# Patient Record
Sex: MALE | Race: WHITE | NOT HISPANIC OR LATINO | Employment: OTHER | URBAN - METROPOLITAN AREA
[De-identification: names, ages, dates, MRNs, and addresses within clinical notes are randomized per-mention and may not be internally consistent; named-entity substitution may affect disease eponyms.]

---

## 2018-08-26 ENCOUNTER — APPOINTMENT (OUTPATIENT)
Dept: CT IMAGING | Facility: HOSPITAL | Age: 78
End: 2018-08-26

## 2018-08-26 ENCOUNTER — APPOINTMENT (OUTPATIENT)
Dept: CARDIOLOGY | Facility: HOSPITAL | Age: 78
End: 2018-08-26
Attending: INTERNAL MEDICINE

## 2018-08-26 ENCOUNTER — HOSPITAL ENCOUNTER (INPATIENT)
Facility: HOSPITAL | Age: 78
LOS: 1 days | Discharge: ANOTHER HOSPITAL | End: 2018-08-28
Attending: EMERGENCY MEDICINE | Admitting: INTERNAL MEDICINE

## 2018-08-26 ENCOUNTER — APPOINTMENT (OUTPATIENT)
Dept: GENERAL RADIOLOGY | Facility: HOSPITAL | Age: 78
End: 2018-08-26

## 2018-08-26 DIAGNOSIS — H33.22 LEFT RETINAL DETACHMENT: ICD-10-CM

## 2018-08-26 DIAGNOSIS — R03.0 ELEVATED BLOOD PRESSURE READING: ICD-10-CM

## 2018-08-26 DIAGNOSIS — Z82.49 FAMILY HISTORY OF CORONARY ARTERY DISEASE: ICD-10-CM

## 2018-08-26 DIAGNOSIS — I25.118 CORONARY ARTERY DISEASE INVOLVING NATIVE CORONARY ARTERY OF NATIVE HEART WITH OTHER FORM OF ANGINA PECTORIS (HCC): ICD-10-CM

## 2018-08-26 DIAGNOSIS — I20.0 UNSTABLE ANGINA (HCC): Primary | ICD-10-CM

## 2018-08-26 PROBLEM — R07.9 CHEST PAIN: Status: ACTIVE | Noted: 2018-08-26

## 2018-08-26 LAB
ALBUMIN SERPL-MCNC: 4.13 G/DL (ref 3.2–4.8)
ALBUMIN/GLOB SERPL: 1.4 G/DL (ref 1.5–2.5)
ALP SERPL-CCNC: 57 U/L (ref 25–100)
ALT SERPL W P-5'-P-CCNC: 24 U/L (ref 7–40)
ANION GAP SERPL CALCULATED.3IONS-SCNC: 6 MMOL/L (ref 3–11)
AST SERPL-CCNC: 21 U/L (ref 0–33)
BASOPHILS # BLD AUTO: 0.03 10*3/MM3 (ref 0–0.2)
BASOPHILS NFR BLD AUTO: 0.5 % (ref 0–1)
BILIRUB SERPL-MCNC: 0.6 MG/DL (ref 0.3–1.2)
BNP SERPL-MCNC: 295 PG/ML (ref 0–100)
BUN BLD-MCNC: 17 MG/DL (ref 9–23)
BUN/CREAT SERPL: 14.3 (ref 7–25)
CALCIUM SPEC-SCNC: 9.1 MG/DL (ref 8.7–10.4)
CHLORIDE SERPL-SCNC: 113 MMOL/L (ref 99–109)
CO2 SERPL-SCNC: 19 MMOL/L (ref 20–31)
CREAT BLD-MCNC: 1.19 MG/DL (ref 0.6–1.3)
DEPRECATED RDW RBC AUTO: 47.4 FL (ref 37–54)
EOSINOPHIL # BLD AUTO: 0.22 10*3/MM3 (ref 0–0.3)
EOSINOPHIL NFR BLD AUTO: 3.6 % (ref 0–3)
ERYTHROCYTE [DISTWIDTH] IN BLOOD BY AUTOMATED COUNT: 13.7 % (ref 11.3–14.5)
GFR SERPL CREATININE-BSD FRML MDRD: 59 ML/MIN/1.73
GLOBULIN UR ELPH-MCNC: 2.9 GM/DL
GLUCOSE BLD-MCNC: 105 MG/DL (ref 70–100)
HCT VFR BLD AUTO: 46.9 % (ref 38.9–50.9)
HGB BLD-MCNC: 15.8 G/DL (ref 13.1–17.5)
HOLD SPECIMEN: NORMAL
HOLD SPECIMEN: NORMAL
IMM GRANULOCYTES # BLD: 0.01 10*3/MM3 (ref 0–0.03)
IMM GRANULOCYTES NFR BLD: 0.2 % (ref 0–0.6)
LIPASE SERPL-CCNC: 35 U/L (ref 6–51)
LYMPHOCYTES # BLD AUTO: 1.91 10*3/MM3 (ref 0.6–4.8)
LYMPHOCYTES NFR BLD AUTO: 31 % (ref 24–44)
MCH RBC QN AUTO: 31.8 PG (ref 27–31)
MCHC RBC AUTO-ENTMCNC: 33.7 G/DL (ref 32–36)
MCV RBC AUTO: 94.4 FL (ref 80–99)
MONOCYTES # BLD AUTO: 0.61 10*3/MM3 (ref 0–1)
MONOCYTES NFR BLD AUTO: 9.9 % (ref 0–12)
NEUTROPHILS # BLD AUTO: 3.39 10*3/MM3 (ref 1.5–8.3)
NEUTROPHILS NFR BLD AUTO: 55 % (ref 41–71)
PLATELET # BLD AUTO: 276 10*3/MM3 (ref 150–450)
PMV BLD AUTO: 10.6 FL (ref 6–12)
POTASSIUM BLD-SCNC: 4.3 MMOL/L (ref 3.5–5.5)
PROT SERPL-MCNC: 7 G/DL (ref 5.7–8.2)
RBC # BLD AUTO: 4.97 10*6/MM3 (ref 4.2–5.76)
SODIUM BLD-SCNC: 138 MMOL/L (ref 132–146)
TROPONIN I SERPL-MCNC: 0.04 NG/ML (ref 0–0.07)
TROPONIN I SERPL-MCNC: 0.05 NG/ML (ref 0–0.07)
WBC NRBC COR # BLD: 6.16 10*3/MM3 (ref 3.5–10.8)
WHOLE BLOOD HOLD SPECIMEN: NORMAL
WHOLE BLOOD HOLD SPECIMEN: NORMAL

## 2018-08-26 PROCEDURE — 93306 TTE W/DOPPLER COMPLETE: CPT

## 2018-08-26 PROCEDURE — 84484 ASSAY OF TROPONIN QUANT: CPT

## 2018-08-26 PROCEDURE — 71045 X-RAY EXAM CHEST 1 VIEW: CPT

## 2018-08-26 PROCEDURE — G0378 HOSPITAL OBSERVATION PER HR: HCPCS

## 2018-08-26 PROCEDURE — 80053 COMPREHEN METABOLIC PANEL: CPT | Performed by: EMERGENCY MEDICINE

## 2018-08-26 PROCEDURE — 99284 EMERGENCY DEPT VISIT MOD MDM: CPT

## 2018-08-26 PROCEDURE — 0 IOPAMIDOL PER 1 ML: Performed by: EMERGENCY MEDICINE

## 2018-08-26 PROCEDURE — 99223 1ST HOSP IP/OBS HIGH 75: CPT | Performed by: INTERNAL MEDICINE

## 2018-08-26 PROCEDURE — 85025 COMPLETE CBC W/AUTO DIFF WBC: CPT | Performed by: EMERGENCY MEDICINE

## 2018-08-26 PROCEDURE — 71275 CT ANGIOGRAPHY CHEST: CPT

## 2018-08-26 PROCEDURE — 25010000002 ENOXAPARIN PER 10 MG: Performed by: INTERNAL MEDICINE

## 2018-08-26 PROCEDURE — 83690 ASSAY OF LIPASE: CPT | Performed by: EMERGENCY MEDICINE

## 2018-08-26 PROCEDURE — 93005 ELECTROCARDIOGRAM TRACING: CPT | Performed by: EMERGENCY MEDICINE

## 2018-08-26 PROCEDURE — 93306 TTE W/DOPPLER COMPLETE: CPT | Performed by: INTERNAL MEDICINE

## 2018-08-26 PROCEDURE — 83880 ASSAY OF NATRIURETIC PEPTIDE: CPT | Performed by: EMERGENCY MEDICINE

## 2018-08-26 PROCEDURE — 70498 CT ANGIOGRAPHY NECK: CPT

## 2018-08-26 RX ORDER — ATORVASTATIN CALCIUM 40 MG/1
40 TABLET, FILM COATED ORAL NIGHTLY
Status: DISCONTINUED | OUTPATIENT
Start: 2018-08-26 | End: 2018-08-28

## 2018-08-26 RX ORDER — NAPROXEN SODIUM 220 MG
220 TABLET ORAL 2 TIMES DAILY PRN
COMMUNITY
End: 2018-08-26

## 2018-08-26 RX ORDER — ACETAMINOPHEN 325 MG/1
650 TABLET ORAL EVERY 4 HOURS PRN
Status: DISCONTINUED | OUTPATIENT
Start: 2018-08-26 | End: 2018-08-28 | Stop reason: HOSPADM

## 2018-08-26 RX ORDER — SODIUM CHLORIDE 0.9 % (FLUSH) 0.9 %
10 SYRINGE (ML) INJECTION AS NEEDED
Status: DISCONTINUED | OUTPATIENT
Start: 2018-08-26 | End: 2018-08-28 | Stop reason: HOSPADM

## 2018-08-26 RX ORDER — ASPIRIN 81 MG/1
324 TABLET, CHEWABLE ORAL ONCE
Status: COMPLETED | OUTPATIENT
Start: 2018-08-26 | End: 2018-08-26

## 2018-08-26 RX ORDER — ASPIRIN 325 MG
325 TABLET ORAL DAILY
COMMUNITY
End: 2018-08-26

## 2018-08-26 RX ORDER — SODIUM CHLORIDE 0.9 % (FLUSH) 0.9 %
1-10 SYRINGE (ML) INJECTION AS NEEDED
Status: DISCONTINUED | OUTPATIENT
Start: 2018-08-26 | End: 2018-08-28 | Stop reason: HOSPADM

## 2018-08-26 RX ADMIN — NITROGLYCERIN 1 INCH: 20 OINTMENT TOPICAL at 11:05

## 2018-08-26 RX ADMIN — ASPIRIN 81 MG CHEWABLE TABLET 324 MG: 81 TABLET CHEWABLE at 10:50

## 2018-08-26 RX ADMIN — ATORVASTATIN CALCIUM 40 MG: 40 TABLET, FILM COATED ORAL at 20:26

## 2018-08-26 RX ADMIN — IOPAMIDOL 120 ML: 755 INJECTION, SOLUTION INTRAVENOUS at 12:08

## 2018-08-26 RX ADMIN — SODIUM CHLORIDE 500 ML: 9 INJECTION, SOLUTION INTRAVENOUS at 12:53

## 2018-08-26 RX ADMIN — ENOXAPARIN SODIUM 40 MG: 40 INJECTION SUBCUTANEOUS at 16:25

## 2018-08-27 ENCOUNTER — APPOINTMENT (OUTPATIENT)
Dept: CARDIOLOGY | Facility: HOSPITAL | Age: 78
End: 2018-08-27
Attending: INTERNAL MEDICINE

## 2018-08-27 LAB
ANION GAP SERPL CALCULATED.3IONS-SCNC: 6 MMOL/L (ref 3–11)
ARTICHOKE IGE QN: 144 MG/DL (ref 0–130)
BH CV ECHO MEAS - AI DEC SLOPE: 138.6 CM/SEC^2
BH CV ECHO MEAS - AI MAX PG: 46.9 MMHG
BH CV ECHO MEAS - AI MAX VEL: 342.5 CM/SEC
BH CV ECHO MEAS - AI P1/2T: 723.7 MSEC
BH CV ECHO MEAS - AO ROOT AREA (BSA CORRECTED): 2.3
BH CV ECHO MEAS - AO ROOT AREA: 16.7 CM^2
BH CV ECHO MEAS - AO ROOT DIAM: 4.6 CM
BH CV ECHO MEAS - BSA(HAYCOCK): 2.1 M^2
BH CV ECHO MEAS - BSA: 2 M^2
BH CV ECHO MEAS - BZI_BMI: 32.3 KILOGRAMS/M^2
BH CV ECHO MEAS - BZI_METRIC_HEIGHT: 170.2 CM
BH CV ECHO MEAS - BZI_METRIC_WEIGHT: 93.4 KG
BH CV ECHO MEAS - EDV(CUBED): 149.7 ML
BH CV ECHO MEAS - EDV(MOD-SP2): 174 ML
BH CV ECHO MEAS - EDV(MOD-SP4): 166 ML
BH CV ECHO MEAS - EDV(TEICH): 135.9 ML
BH CV ECHO MEAS - EF(CUBED): 40.3 %
BH CV ECHO MEAS - EF(MOD-BP): 50 %
BH CV ECHO MEAS - EF(MOD-SP2): 50 %
BH CV ECHO MEAS - EF(MOD-SP4): 50.6 %
BH CV ECHO MEAS - EF(TEICH): 33 %
BH CV ECHO MEAS - ESV(CUBED): 89.4 ML
BH CV ECHO MEAS - ESV(MOD-SP2): 87 ML
BH CV ECHO MEAS - ESV(MOD-SP4): 82 ML
BH CV ECHO MEAS - ESV(TEICH): 91.1 ML
BH CV ECHO MEAS - FS: 15.8 %
BH CV ECHO MEAS - IVS/LVPW: 1
BH CV ECHO MEAS - IVSD: 1.2 CM
BH CV ECHO MEAS - LA DIMENSION: 3.9 CM
BH CV ECHO MEAS - LA/AO: 0.85
BH CV ECHO MEAS - LAT PEAK E' VEL: 4.5 CM/SEC
BH CV ECHO MEAS - LV DIASTOLIC VOL/BSA (35-75): 81.1 ML/M^2
BH CV ECHO MEAS - LV MASS(C)D: 249.2 GRAMS
BH CV ECHO MEAS - LV MASS(C)DI: 121.7 GRAMS/M^2
BH CV ECHO MEAS - LV SYSTOLIC VOL/BSA (12-30): 40 ML/M^2
BH CV ECHO MEAS - LVIDD: 5.3 CM
BH CV ECHO MEAS - LVIDS: 4.5 CM
BH CV ECHO MEAS - LVLD AP2: 9.3 CM
BH CV ECHO MEAS - LVLD AP4: 8.4 CM
BH CV ECHO MEAS - LVLS AP2: 8.3 CM
BH CV ECHO MEAS - LVLS AP4: 7.4 CM
BH CV ECHO MEAS - LVPWD: 1.2 CM
BH CV ECHO MEAS - MED PEAK E' VEL: 3.9 CM/SEC
BH CV ECHO MEAS - PA ACC SLOPE: 551.1 CM/SEC^2
BH CV ECHO MEAS - PA ACC TIME: 0.1 SEC
BH CV ECHO MEAS - PA PR(ACCEL): 33.1 MMHG
BH CV ECHO MEAS - SI(CUBED): 29.4 ML/M^2
BH CV ECHO MEAS - SI(MOD-SP2): 42.5 ML/M^2
BH CV ECHO MEAS - SI(MOD-SP4): 41 ML/M^2
BH CV ECHO MEAS - SI(TEICH): 21.9 ML/M^2
BH CV ECHO MEAS - SV(CUBED): 60.3 ML
BH CV ECHO MEAS - SV(MOD-SP2): 87 ML
BH CV ECHO MEAS - SV(MOD-SP4): 84 ML
BH CV ECHO MEAS - SV(TEICH): 44.8 ML
BH CV ECHO MEAS - TAPSE (>1.6): 2.2 CM2
BH CV STRESS BP STAGE 1: NORMAL
BH CV STRESS BP STAGE 3: NORMAL
BH CV STRESS COMMENTS STAGE 1: NORMAL
BH CV STRESS DOSE REGADENOSON STAGE 1: 0.4
BH CV STRESS DURATION MIN STAGE 1: 1
BH CV STRESS DURATION MIN STAGE 2: 1
BH CV STRESS DURATION MIN STAGE 3: 1
BH CV STRESS DURATION MIN STAGE 4: 1
BH CV STRESS DURATION SEC STAGE 1: 0
BH CV STRESS DURATION SEC STAGE 2: 0
BH CV STRESS DURATION SEC STAGE 3: 0
BH CV STRESS DURATION SEC STAGE 4: 0
BH CV STRESS HR STAGE 1: 83
BH CV STRESS HR STAGE 2: 96
BH CV STRESS HR STAGE 3: 95
BH CV STRESS HR STAGE 4: 92
BH CV STRESS PROTOCOL 1: NORMAL
BH CV STRESS RECOVERY BP: NORMAL MMHG
BH CV STRESS RECOVERY HR: 90 BPM
BH CV STRESS RECOVERY O2: 99 %
BH CV STRESS STAGE 1: 1
BH CV STRESS STAGE 2: 2
BH CV STRESS STAGE 3: 3
BH CV STRESS STAGE 4: 4
BH CV VAS BP RIGHT ARM: NORMAL MMHG
BH CV XLRA - RV BASE: 3.4 CM
BH CV XLRA - RV LENGTH: 7.7 CM
BH CV XLRA - RV MID: 2.8 CM
BH CV XLRA - TDI S': 13.7 CM/SEC
BUN BLD-MCNC: 19 MG/DL (ref 9–23)
BUN/CREAT SERPL: 18.1 (ref 7–25)
CALCIUM SPEC-SCNC: 8.8 MG/DL (ref 8.7–10.4)
CHLORIDE SERPL-SCNC: 113 MMOL/L (ref 99–109)
CHOLEST SERPL-MCNC: 196 MG/DL (ref 0–200)
CO2 SERPL-SCNC: 19 MMOL/L (ref 20–31)
CREAT BLD-MCNC: 1.05 MG/DL (ref 0.6–1.3)
GFR SERPL CREATININE-BSD FRML MDRD: 68 ML/MIN/1.73
GLUCOSE BLD-MCNC: 97 MG/DL (ref 70–100)
HDLC SERPL-MCNC: 39 MG/DL (ref 40–60)
LEFT ATRIUM VOLUME INDEX: 14.6 ML/M2
MAXIMAL PREDICTED HEART RATE: 143 BPM
MAXIMAL PREDICTED HEART RATE: 143 BPM
PERCENT MAX PREDICTED HR: 69.93 %
POTASSIUM BLD-SCNC: 4.4 MMOL/L (ref 3.5–5.5)
SODIUM BLD-SCNC: 138 MMOL/L (ref 132–146)
STRESS BASELINE BP: NORMAL MMHG
STRESS BASELINE HR: 75 BPM
STRESS O2 SAT REST: 94 %
STRESS PERCENT HR: 82 %
STRESS POST O2 SAT PEAK: 98 %
STRESS POST PEAK BP: NORMAL MMHG
STRESS POST PEAK HR: 100 BPM
STRESS TARGET HR: 122 BPM
STRESS TARGET HR: 122 BPM
TRIGL SERPL-MCNC: 182 MG/DL (ref 0–150)

## 2018-08-27 PROCEDURE — G0378 HOSPITAL OBSERVATION PER HR: HCPCS

## 2018-08-27 PROCEDURE — 93454 CORONARY ARTERY ANGIO S&I: CPT | Performed by: INTERNAL MEDICINE

## 2018-08-27 PROCEDURE — 0 RUBIDIUM CHLORIDE: Performed by: INTERNAL MEDICINE

## 2018-08-27 PROCEDURE — 4A023N7 MEASUREMENT OF CARDIAC SAMPLING AND PRESSURE, LEFT HEART, PERCUTANEOUS APPROACH: ICD-10-PCS | Performed by: INTERNAL MEDICINE

## 2018-08-27 PROCEDURE — C1894 INTRO/SHEATH, NON-LASER: HCPCS | Performed by: INTERNAL MEDICINE

## 2018-08-27 PROCEDURE — B3101ZZ FLUOROSCOPY OF THORACIC AORTA USING LOW OSMOLAR CONTRAST: ICD-10-PCS | Performed by: INTERNAL MEDICINE

## 2018-08-27 PROCEDURE — 93017 CV STRESS TEST TRACING ONLY: CPT

## 2018-08-27 PROCEDURE — 93018 CV STRESS TEST I&R ONLY: CPT | Performed by: INTERNAL MEDICINE

## 2018-08-27 PROCEDURE — 25010000002 REGADENOSON 0.4 MG/5ML SOLUTION: Performed by: INTERNAL MEDICINE

## 2018-08-27 PROCEDURE — 93567 NJX CAR CTH SPRVLV AORTGRPHY: CPT | Performed by: INTERNAL MEDICINE

## 2018-08-27 PROCEDURE — C1769 GUIDE WIRE: HCPCS | Performed by: INTERNAL MEDICINE

## 2018-08-27 PROCEDURE — 80061 LIPID PANEL: CPT | Performed by: INTERNAL MEDICINE

## 2018-08-27 PROCEDURE — B2111ZZ FLUOROSCOPY OF MULTIPLE CORONARY ARTERIES USING LOW OSMOLAR CONTRAST: ICD-10-PCS | Performed by: INTERNAL MEDICINE

## 2018-08-27 PROCEDURE — 78492 MYOCRD IMG PET MLT RST&STRS: CPT | Performed by: INTERNAL MEDICINE

## 2018-08-27 PROCEDURE — 78492 MYOCRD IMG PET MLT RST&STRS: CPT

## 2018-08-27 PROCEDURE — 25010000002 MIDAZOLAM PER 1 MG: Performed by: INTERNAL MEDICINE

## 2018-08-27 PROCEDURE — A9555 RB82 RUBIDIUM: HCPCS | Performed by: INTERNAL MEDICINE

## 2018-08-27 PROCEDURE — 80048 BASIC METABOLIC PNL TOTAL CA: CPT | Performed by: INTERNAL MEDICINE

## 2018-08-27 PROCEDURE — 0 IOPAMIDOL PER 1 ML: Performed by: INTERNAL MEDICINE

## 2018-08-27 PROCEDURE — 99024 POSTOP FOLLOW-UP VISIT: CPT | Performed by: INTERNAL MEDICINE

## 2018-08-27 RX ORDER — CHLORHEXIDINE GLUCONATE 500 MG/1
1 CLOTH TOPICAL EVERY 12 HOURS PRN
Status: DISCONTINUED | OUTPATIENT
Start: 2018-08-27 | End: 2018-08-27

## 2018-08-27 RX ORDER — IPRATROPIUM BROMIDE AND ALBUTEROL SULFATE 2.5; .5 MG/3ML; MG/3ML
3 SOLUTION RESPIRATORY (INHALATION) EVERY 4 HOURS PRN
Status: DISCONTINUED | OUTPATIENT
Start: 2018-08-27 | End: 2018-08-27

## 2018-08-27 RX ORDER — ASPIRIN 81 MG/1
81 TABLET ORAL ONCE
Status: DISCONTINUED | OUTPATIENT
Start: 2018-08-27 | End: 2018-08-28 | Stop reason: HOSPADM

## 2018-08-27 RX ORDER — CHLORHEXIDINE GLUCONATE 0.12 MG/ML
15 RINSE ORAL EVERY 12 HOURS
Status: DISCONTINUED | OUTPATIENT
Start: 2018-08-27 | End: 2018-08-27

## 2018-08-27 RX ORDER — SODIUM CHLORIDE 0.9 % (FLUSH) 0.9 %
1-10 SYRINGE (ML) INJECTION AS NEEDED
Status: DISCONTINUED | OUTPATIENT
Start: 2018-08-27 | End: 2018-08-28 | Stop reason: HOSPADM

## 2018-08-27 RX ORDER — ASPIRIN 81 MG/1
81 TABLET ORAL DAILY
Status: DISCONTINUED | OUTPATIENT
Start: 2018-08-27 | End: 2018-08-27

## 2018-08-27 RX ORDER — SODIUM CHLORIDE 9 MG/ML
100 INJECTION, SOLUTION INTRAVENOUS CONTINUOUS
Status: ACTIVE | OUTPATIENT
Start: 2018-08-27 | End: 2018-08-27

## 2018-08-27 RX ORDER — CARVEDILOL 3.12 MG/1
3.12 TABLET ORAL EVERY 12 HOURS SCHEDULED
Status: DISCONTINUED | OUTPATIENT
Start: 2018-08-27 | End: 2018-08-28 | Stop reason: HOSPADM

## 2018-08-27 RX ORDER — MIDAZOLAM HYDROCHLORIDE 1 MG/ML
INJECTION INTRAMUSCULAR; INTRAVENOUS AS NEEDED
Status: DISCONTINUED | OUTPATIENT
Start: 2018-08-27 | End: 2018-08-27 | Stop reason: HOSPADM

## 2018-08-27 RX ORDER — LIDOCAINE HYDROCHLORIDE 10 MG/ML
INJECTION, SOLUTION EPIDURAL; INFILTRATION; INTRACAUDAL; PERINEURAL AS NEEDED
Status: DISCONTINUED | OUTPATIENT
Start: 2018-08-27 | End: 2018-08-27 | Stop reason: HOSPADM

## 2018-08-27 RX ADMIN — ATORVASTATIN CALCIUM 40 MG: 40 TABLET, FILM COATED ORAL at 21:40

## 2018-08-27 RX ADMIN — CARVEDILOL 3.12 MG: 3.12 TABLET, FILM COATED ORAL at 21:40

## 2018-08-27 RX ADMIN — REGADENOSON 0.4 MG: 0.08 INJECTION, SOLUTION INTRAVENOUS at 09:20

## 2018-08-27 RX ADMIN — RUBIDIUM CHLORIDE RB-82 1 DOSE: 150 INJECTION, SOLUTION INTRAVENOUS at 09:20

## 2018-08-27 RX ADMIN — ASPIRIN 81 MG: 81 TABLET, COATED ORAL at 09:51

## 2018-08-27 RX ADMIN — RUBIDIUM CHLORIDE RB-82 1 DOSE: 150 INJECTION, SOLUTION INTRAVENOUS at 09:09

## 2018-08-28 VITALS
BODY MASS INDEX: 31.86 KG/M2 | SYSTOLIC BLOOD PRESSURE: 147 MMHG | OXYGEN SATURATION: 98 % | TEMPERATURE: 98.1 F | HEART RATE: 72 BPM | DIASTOLIC BLOOD PRESSURE: 92 MMHG | RESPIRATION RATE: 15 BRPM | HEIGHT: 67 IN | WEIGHT: 203 LBS

## 2018-08-28 PROBLEM — I20.0 UNSTABLE ANGINA (HCC): Status: ACTIVE | Noted: 2018-08-28

## 2018-08-28 PROCEDURE — 99238 HOSP IP/OBS DSCHRG MGMT 30/<: CPT | Performed by: INTERNAL MEDICINE

## 2018-08-28 RX ORDER — ISOSORBIDE DINITRATE 20 MG/1
20 TABLET ORAL
Qty: 90 TABLET | Refills: 1 | Status: SHIPPED | OUTPATIENT
Start: 2018-08-28 | End: 2018-10-29

## 2018-08-28 RX ORDER — ATORVASTATIN CALCIUM 40 MG/1
80 TABLET, FILM COATED ORAL NIGHTLY
Status: DISCONTINUED | OUTPATIENT
Start: 2018-08-28 | End: 2018-08-28 | Stop reason: HOSPADM

## 2018-08-28 RX ORDER — CARVEDILOL 3.12 MG/1
3.12 TABLET ORAL EVERY 12 HOURS SCHEDULED
Qty: 60 TABLET | Refills: 1 | Status: SHIPPED | OUTPATIENT
Start: 2018-08-28 | End: 2018-10-29

## 2018-08-28 RX ORDER — ISOSORBIDE DINITRATE 20 MG/1
20 TABLET ORAL
Status: DISCONTINUED | OUTPATIENT
Start: 2018-08-28 | End: 2018-08-28 | Stop reason: HOSPADM

## 2018-08-28 RX ORDER — ASPIRIN 81 MG/1
81 TABLET ORAL DAILY
Qty: 30 TABLET | Refills: 0 | Status: SHIPPED | OUTPATIENT
Start: 2018-08-28 | End: 2020-03-11

## 2018-08-28 RX ORDER — ATORVASTATIN CALCIUM 80 MG/1
80 TABLET, FILM COATED ORAL NIGHTLY
Qty: 30 TABLET | Refills: 1 | Status: SHIPPED | OUTPATIENT
Start: 2018-08-28 | End: 2018-10-29 | Stop reason: SDUPTHER

## 2018-08-28 RX ADMIN — CARVEDILOL 3.12 MG: 3.12 TABLET, FILM COATED ORAL at 09:40

## 2018-10-29 ENCOUNTER — OFFICE VISIT (OUTPATIENT)
Dept: CARDIOLOGY | Facility: CLINIC | Age: 78
End: 2018-10-29

## 2018-10-29 VITALS
HEART RATE: 78 BPM | HEIGHT: 67 IN | DIASTOLIC BLOOD PRESSURE: 64 MMHG | BODY MASS INDEX: 28.88 KG/M2 | OXYGEN SATURATION: 96 % | WEIGHT: 184 LBS | SYSTOLIC BLOOD PRESSURE: 118 MMHG

## 2018-10-29 DIAGNOSIS — I25.10 CORONARY ARTERY DISEASE INVOLVING NATIVE CORONARY ARTERY OF NATIVE HEART WITHOUT ANGINA PECTORIS: Primary | ICD-10-CM

## 2018-10-29 DIAGNOSIS — E78.2 MIXED HYPERLIPIDEMIA: ICD-10-CM

## 2018-10-29 DIAGNOSIS — I50.22 CHRONIC SYSTOLIC CONGESTIVE HEART FAILURE (HCC): ICD-10-CM

## 2018-10-29 DIAGNOSIS — I10 ESSENTIAL HYPERTENSION: ICD-10-CM

## 2018-10-29 PROBLEM — I20.0 UNSTABLE ANGINA (HCC): Status: RESOLVED | Noted: 2018-08-28 | Resolved: 2018-10-29

## 2018-10-29 PROBLEM — R07.9 CHEST PAIN: Status: RESOLVED | Noted: 2018-08-26 | Resolved: 2018-10-29

## 2018-10-29 PROCEDURE — 99213 OFFICE O/P EST LOW 20 MIN: CPT | Performed by: INTERNAL MEDICINE

## 2018-10-29 RX ORDER — TAMSULOSIN HYDROCHLORIDE 0.4 MG/1
2 CAPSULE ORAL NIGHTLY
COMMUNITY
End: 2020-09-14

## 2018-10-29 RX ORDER — ATORVASTATIN CALCIUM 80 MG/1
80 TABLET, FILM COATED ORAL NIGHTLY
Qty: 90 TABLET | Refills: 1 | Status: SHIPPED | OUTPATIENT
Start: 2018-10-29 | End: 2019-09-23 | Stop reason: SDUPTHER

## 2018-10-29 RX ORDER — SULFAMETHOXAZOLE AND TRIMETHOPRIM 400; 80 MG/1; MG/1
2 TABLET ORAL DAILY
COMMUNITY
End: 2019-09-09

## 2018-10-29 NOTE — PROGRESS NOTES
[facility] at [facility]  Office visit  Harpreet Ceballos  1940    There is no work phone number on file.    VISIT DATE:  10/29/2018    PCP: Provider, No Known  [place] [phone]    CC:  No chief complaint on file.      Previous cardiac studies and procedures:  August 2018  Echo  · Calculated EF = 50%. Estimated EF appears to be in the range of 51 - 55%.  · Left ventricular wall thickness is consistent with mild concentric hypertrophy.  · Mild to moderate aortic valve regurgitation is present.  · Mild dilation of the ascending aorta is present. 4 cm.  Myocardial perfusion imaging  · REST EF = 38% STRESS EF = 34%.  · Left ventricular ejection fraction is moderately reduced and severely dilated.  · Moderate size region of moderate ischemia involving the anterior wall and apex.  · Large region of ischemia involving the entire lateral wall, significantly decreased at rest well potentially consistent for underlying infarction versus hibernating myocardium  · Impressions are consistent with a high risk study. Concerning for multivessel disease.  Cardiac catheterization  · LM: Dilated as a continuation of dilated sinuses of Valsalva.    · LAD: 70% ostial stenosis, 60% proximal stenosis and a long 80% midsegment stenosis.  The distal vessel has diffuse plaque.  A medium size first diagonal has 90% stenosis.  · LCX: Nondominant vessel with 70% proximal stenosis and total mid segment occlusion.  A major obtuse marginal branch is also totally occluded in its midsegment.  The lateral filling of the obtuse marginal and the distal circumflex is seen via left-sided collaterals.  · RCA: Dominant vessel with a 90% distal stenosis.  · Three-vessel obstructive coronary disease.    September 2018   -coronary bypass grafting: LIMA to first diagonal and left anterior descending artery, vein graft to obtuse marginal one and obtuse marginal 2, vein graft to right posterior descending  artery.    ASSESSMENT:   Diagnosis Plan   1. Coronary artery disease involving native coronary artery of native heart without angina pectoris     2. Mixed hyperlipidemia     3. Essential hypertension         PLAN:  Echo in 4 months to assess postoperative EF  Fasting lipid panel and CMP in 4 months prior follow-up.  Goal LDL less than 70.  Continue high intensity statin therapy.  Continue low-dose beta-blockade.  Continue low-dose aspirin.    Subjective  Approximately 2 months status post surgical coronary revascularization.  Progressing very well.  Denies chest pain, dyspnea on exertion or palpitations.  Blood pressures running less than 130/80 mmHg.  He is compliant with statin.  Denies myalgias.  Has been gradually increasing his activity level without limitation.  Has brief episodes of dental SVT versus A. fib versus telemetry artifact postoperative period he trends his blood pressure closely at home and has not noticed any irregular heart rhythms at home.    PHYSICAL EXAMINATION:  There were no vitals filed for this visit.  General Appearance:    Alert, cooperative, no distress, appears stated age   Head:    Normocephalic, without obvious abnormality, atraumatic   Eyes:    conjunctiva/corneas clear   Nose:   Nares normal, septum midline, mucosa normal, no drainage   Throat:   Lips, teeth and gums normal   Neck:   Supple, symmetrical, trachea midline, no carotid    bruit or JVD   Lungs:     Clear to auscultation bilaterally, respirations unlabored   Chest Wall:    No tenderness or deformity    Heart:    Regular rate and rhythm, S1 and S2 normal, no murmur, rub   or gallop, normal carotid impulse bilaterally without bruit.   Abdomen:     Soft, non-tender   Extremities:   Extremities normal, atraumatic, no cyanosis or edema   Pulses:   2+ and symmetric all extremities   Skin:   Skin color, texture, turgor normal, no rashes or lesions       Diagnostic Data:  Procedures  Lab Results   Component Value Date    TRIG  182 (H) 08/27/2018    HDL 39 (L) 08/27/2018     Lab Results   Component Value Date    GLUCOSE 97 08/27/2018    BUN 19 08/27/2018    CREATININE 1.05 08/27/2018     08/27/2018    K 4.4 08/27/2018     (H) 08/27/2018    CO2 19.0 (L) 08/27/2018     No results found for: HGBA1C  Lab Results   Component Value Date    WBC 6.16 08/26/2018    HGB 15.8 08/26/2018    HCT 46.9 08/26/2018     08/26/2018       Allergies  No Known Allergies    Current Medications    Current Outpatient Prescriptions:   •  aspirin 81 MG EC tablet, Take 1 tablet by mouth Daily., Disp: 30 tablet, Rfl: 0  •  atorvastatin (LIPITOR) 80 MG tablet, Take 1 tablet by mouth Every Night., Disp: 30 tablet, Rfl: 1  •  carvedilol (COREG) 3.125 MG tablet, Take 1 tablet by mouth Every 12 (Twelve) Hours., Disp: 60 tablet, Rfl: 1  •  isosorbide dinitrate (ISORDIL) 20 MG tablet, Take 1 tablet by mouth 3 (Three) Times a Day., Disp: 90 tablet, Rfl: 1          ROS  Review of Systems   Cardiovascular: Negative for chest pain, dyspnea on exertion, leg swelling, near-syncope and palpitations.   Respiratory: Negative for cough, shortness of breath and sputum production.        SOCIAL HX  Social History     Social History   • Marital status:      Spouse name: N/A   • Number of children: N/A   • Years of education: N/A     Occupational History   • Not on file.     Social History Main Topics   • Smoking status: Never Smoker   • Smokeless tobacco: Never Used   • Alcohol use 0.6 oz/week     1 Cans of beer per week      Comment: A BEER A DAY   • Drug use: No   • Sexual activity: Defer     Other Topics Concern   • Not on file     Social History Narrative   • No narrative on file       FAMILY HX  No family history on file.          Oscar Anderson III, MD, [facility]

## 2019-02-26 ENCOUNTER — LAB (OUTPATIENT)
Dept: LAB | Facility: HOSPITAL | Age: 79
End: 2019-02-26

## 2019-02-26 DIAGNOSIS — E78.2 MIXED HYPERLIPIDEMIA: ICD-10-CM

## 2019-02-26 LAB
ALBUMIN SERPL-MCNC: 4.4 G/DL (ref 3.5–5)
ALBUMIN/GLOB SERPL: 1.4 G/DL (ref 1–2)
ALP SERPL-CCNC: 69 U/L (ref 38–126)
ALT SERPL W P-5'-P-CCNC: 19 U/L (ref 13–69)
ANION GAP SERPL CALCULATED.3IONS-SCNC: 15.5 MMOL/L (ref 10–20)
AST SERPL-CCNC: 21 U/L (ref 15–46)
BILIRUB SERPL-MCNC: 0.9 MG/DL (ref 0.2–1.3)
BUN BLD-MCNC: 18 MG/DL (ref 7–20)
BUN/CREAT SERPL: 16.4 (ref 6.3–21.9)
CALCIUM SPEC-SCNC: 9.6 MG/DL (ref 8.4–10.2)
CHLORIDE SERPL-SCNC: 108 MMOL/L (ref 98–107)
CHOLEST SERPL-MCNC: 116 MG/DL (ref 0–199)
CO2 SERPL-SCNC: 24 MMOL/L (ref 26–30)
CREAT BLD-MCNC: 1.1 MG/DL (ref 0.6–1.3)
GFR SERPL CREATININE-BSD FRML MDRD: 65 ML/MIN/1.73
GLOBULIN UR ELPH-MCNC: 3.1 GM/DL
GLUCOSE BLD-MCNC: 87 MG/DL (ref 74–98)
HDLC SERPL-MCNC: 45 MG/DL (ref 40–60)
LDLC SERPL CALC-MCNC: 52 MG/DL (ref 0–99)
LDLC/HDLC SERPL: 1.15 {RATIO}
POTASSIUM BLD-SCNC: 4.5 MMOL/L (ref 3.5–5.1)
PROT SERPL-MCNC: 7.5 G/DL (ref 6.3–8.2)
SODIUM BLD-SCNC: 143 MMOL/L (ref 137–145)
TRIGL SERPL-MCNC: 97 MG/DL
VLDLC SERPL-MCNC: 19.4 MG/DL

## 2019-02-26 PROCEDURE — 80053 COMPREHEN METABOLIC PANEL: CPT

## 2019-02-26 PROCEDURE — 80061 LIPID PANEL: CPT

## 2019-02-26 PROCEDURE — 36415 COLL VENOUS BLD VENIPUNCTURE: CPT

## 2019-03-01 ENCOUNTER — HOSPITAL ENCOUNTER (OUTPATIENT)
Dept: CARDIOLOGY | Facility: HOSPITAL | Age: 79
Discharge: HOME | End: 2019-03-01
Attending: INTERNAL MEDICINE | Admitting: INTERNAL MEDICINE

## 2019-03-01 DIAGNOSIS — I25.10 CORONARY ARTERY DISEASE INVOLVING NATIVE CORONARY ARTERY OF NATIVE HEART WITHOUT ANGINA PECTORIS: ICD-10-CM

## 2019-03-01 LAB
BH CV ECHO MEAS - AI DEC SLOPE: 136.7 CM/SEC^2
BH CV ECHO MEAS - AI MAX PG: 62.9 MMHG
BH CV ECHO MEAS - AI MAX VEL: 396.6 CM/SEC
BH CV ECHO MEAS - AI P1/2T: 849.7 MSEC
BH CV ECHO MEAS - AO ROOT AREA (BSA CORRECTED): 2.4
BH CV ECHO MEAS - AO ROOT AREA: 16.6 CM^2
BH CV ECHO MEAS - AO ROOT DIAM: 4.6 CM
BH CV ECHO MEAS - BSA(HAYCOCK): 2 M^2
BH CV ECHO MEAS - BSA: 2 M^2
BH CV ECHO MEAS - BZI_BMI: 28.8 KILOGRAMS/M^2
BH CV ECHO MEAS - BZI_METRIC_HEIGHT: 170.2 CM
BH CV ECHO MEAS - BZI_METRIC_WEIGHT: 83.5 KG
BH CV ECHO MEAS - EDV(CUBED): 123.4 ML
BH CV ECHO MEAS - EDV(TEICH): 117.1 ML
BH CV ECHO MEAS - EF(CUBED): 56 %
BH CV ECHO MEAS - EF(TEICH): 47.5 %
BH CV ECHO MEAS - ESV(CUBED): 54.3 ML
BH CV ECHO MEAS - ESV(TEICH): 61.4 ML
BH CV ECHO MEAS - FS: 24 %
BH CV ECHO MEAS - IVS/LVPW: 1
BH CV ECHO MEAS - IVSD: 1.3 CM
BH CV ECHO MEAS - LA DIMENSION: 3.5 CM
BH CV ECHO MEAS - LA/AO: 0.77
BH CV ECHO MEAS - LAD MAJOR: 5.2 CM
BH CV ECHO MEAS - LAT PEAK E' VEL: 8 CM/SEC
BH CV ECHO MEAS - LATERAL E/E' RATIO: 21.2
BH CV ECHO MEAS - LV MASS(C)D: 247.8 GRAMS
BH CV ECHO MEAS - LV MASS(C)DI: 127 GRAMS/M^2
BH CV ECHO MEAS - LVIDD: 5 CM
BH CV ECHO MEAS - LVIDS: 3.8 CM
BH CV ECHO MEAS - LVPWD: 1.2 CM
BH CV ECHO MEAS - MED PEAK E' VEL: 9.1 CM/SEC
BH CV ECHO MEAS - MEDIAL E/E' RATIO: 18.7
BH CV ECHO MEAS - MV A MAX VEL: 158.4 CM/SEC
BH CV ECHO MEAS - MV DEC SLOPE: 312.6 CM/SEC^2
BH CV ECHO MEAS - MV DEC TIME: 0.43 SEC
BH CV ECHO MEAS - MV E MAX VEL: 172.8 CM/SEC
BH CV ECHO MEAS - MV E/A: 1.1
BH CV ECHO MEAS - MV P1/2T MAX VEL: 133 CM/SEC
BH CV ECHO MEAS - MV P1/2T: 124.6 MSEC
BH CV ECHO MEAS - MVA P1/2T LCG: 1.7 CM^2
BH CV ECHO MEAS - MVA(P1/2T): 1.8 CM^2
BH CV ECHO MEAS - PA ACC SLOPE: 1008 CM/SEC^2
BH CV ECHO MEAS - PA ACC TIME: 0.07 SEC
BH CV ECHO MEAS - PA PR(ACCEL): 45.7 MMHG
BH CV ECHO MEAS - RAP SYSTOLE: 3 MMHG
BH CV ECHO MEAS - RV MAX PG: 1.1 MMHG
BH CV ECHO MEAS - RV V1 MAX: 51.3 CM/SEC
BH CV ECHO MEAS - RVSP: 18 MMHG
BH CV ECHO MEAS - SI(CUBED): 35.4 ML/M^2
BH CV ECHO MEAS - SI(TEICH): 28.5 ML/M^2
BH CV ECHO MEAS - SV(CUBED): 69.1 ML
BH CV ECHO MEAS - SV(TEICH): 55.7 ML
BH CV ECHO MEAS - TAPSE (>1.6): 1.8 CM2
BH CV ECHO MEAS - TR MAX PG: 15 MMHG
BH CV ECHO MEAS - TR MAX VEL: 190 CM/SEC
BH CV ECHO MEASUREMENTS AVERAGE E/E' RATIO: 20.21
BH CV XLRA - RV BASE: 3.9 CM
BH CV XLRA - RV LENGTH: 7 CM
BH CV XLRA - RV MID: 2.9 CM
BH CV XLRA - TDI S': 2.2 CM/SEC
LEFT ATRIUM VOLUME INDEX: 23.6 ML/M^2
LEFT ATRIUM VOLUME: 46 ML
MAXIMAL PREDICTED HEART RATE: 143 BPM
STRESS TARGET HR: 122 BPM

## 2019-03-01 PROCEDURE — 93306 TTE W/DOPPLER COMPLETE: CPT

## 2019-03-01 PROCEDURE — 93306 TTE W/DOPPLER COMPLETE: CPT | Performed by: INTERNAL MEDICINE

## 2019-03-04 ENCOUNTER — OFFICE VISIT (OUTPATIENT)
Dept: CARDIOLOGY | Facility: CLINIC | Age: 79
End: 2019-03-04

## 2019-03-04 VITALS
OXYGEN SATURATION: 95 % | DIASTOLIC BLOOD PRESSURE: 86 MMHG | WEIGHT: 191 LBS | HEART RATE: 74 BPM | HEIGHT: 68 IN | BODY MASS INDEX: 28.95 KG/M2 | SYSTOLIC BLOOD PRESSURE: 144 MMHG

## 2019-03-04 DIAGNOSIS — I65.23 CAROTID STENOSIS, ASYMPTOMATIC, BILATERAL: ICD-10-CM

## 2019-03-04 DIAGNOSIS — I25.10 CORONARY ARTERY DISEASE INVOLVING NATIVE CORONARY ARTERY OF NATIVE HEART WITHOUT ANGINA PECTORIS: Primary | ICD-10-CM

## 2019-03-04 DIAGNOSIS — I10 ESSENTIAL HYPERTENSION: ICD-10-CM

## 2019-03-04 DIAGNOSIS — E78.2 MIXED HYPERLIPIDEMIA: ICD-10-CM

## 2019-03-04 PROCEDURE — 99213 OFFICE O/P EST LOW 20 MIN: CPT | Performed by: INTERNAL MEDICINE

## 2019-03-04 RX ORDER — LOSARTAN POTASSIUM 50 MG/1
50 TABLET ORAL DAILY
Qty: 30 TABLET | Refills: 6 | Status: SHIPPED | OUTPATIENT
Start: 2019-03-04 | End: 2019-09-09 | Stop reason: SDUPTHER

## 2019-03-04 NOTE — PROGRESS NOTES
[facility] at [facility]  Office visit  Harpreet Ceballos  1940    There is no work phone number on file.    VISIT DATE:  10/29/2018    PCP: Provider, No Known  [place] [phone]    CC:  No chief complaint on file.      Previous cardiac studies and procedures:  August 2018  Echo  · Calculated EF = 50%. Estimated EF appears to be in the range of 51 - 55%.  · Left ventricular wall thickness is consistent with mild concentric hypertrophy.  · Mild to moderate aortic valve regurgitation is present.  · Mild dilation of the ascending aorta is present. 4 cm.  Myocardial perfusion imaging  · REST EF = 38% STRESS EF = 34%.  · Left ventricular ejection fraction is moderately reduced and severely dilated.  · Moderate size region of moderate ischemia involving the anterior wall and apex.  · Large region of ischemia involving the entire lateral wall, significantly decreased at rest well potentially consistent for underlying infarction versus hibernating myocardium  · Impressions are consistent with a high risk study. Concerning for multivessel disease.  Cardiac catheterization  · LM: Dilated as a continuation of dilated sinuses of Valsalva.    · LAD: 70% ostial stenosis, 60% proximal stenosis and a long 80% midsegment stenosis.  The distal vessel has diffuse plaque.  A medium size first diagonal has 90% stenosis.  · LCX: Nondominant vessel with 70% proximal stenosis and total mid segment occlusion.  A major obtuse marginal branch is also totally occluded in its midsegment.  The lateral filling of the obtuse marginal and the distal circumflex is seen via left-sided collaterals.  · RCA: Dominant vessel with a 90% distal stenosis.  · Three-vessel obstructive coronary disease.  CT angio chest  dilatation of the aortic root measuring up to 4.3 cm   CTA neck  Atherosclerotic involvement of the bilateral carotid bulbs far greater  on the right with approximately 80% luminal narrowing by  NASCET  Criteria.    September 2018   -coronary bypass grafting: LIMA to first diagonal and left anterior descending artery, vein graft to obtuse marginal one and obtuse marginal 2, vein graft to right posterior descending artery.    March 2019 echo  · Estimated EF appears to be in the range of 56 - 60%.  · Mild dilation of the sinuses of Valsalva, 4cm. Mild dilation of the ascending aorta, 4.3cm.  · Mild aortic valve regurgitation is present.  · Elevated left atrial pressure.    ASSESSMENT:   Diagnosis Plan   1. Coronary artery disease involving native coronary artery of native heart without angina pectoris     2. Essential hypertension     3. Mixed hyperlipidemia         PLAN:  Coronary artery disease: Stable and asymptomatic.  Continue aspirin, statin and afterload reduction  Hyperlipidemia: Currently with excellent control.  Goal LDL less than 70.  Continue high intensity statin therapy.  Carotid stenosis, bilateral: Continue aggressive risk factor modification.  Annual carotid ultrasound imaging.  Ascending aortic aneurysm: Goal blood pressure less than 130/80 mmHg.  Continue low-dose beta-blockade.  Adding losartan 25 mg p.o. daily.    Subjective  Mild shortness of breath and a class II pattern which is stable, experiences with such activities with walking up a steep incline.  Systolic blood pressures running in the 135-145 mmHg range.  He is compliant with medical therapy.  Denies easy bruising or bleeding complications on low-dose aspirin.  No myalgias on statin therapy.  Reviewed most recent transthoracic echo and fasting lipid panel.    PHYSICAL EXAMINATION:  There were no vitals filed for this visit.  General Appearance:    Alert, cooperative, no distress, appears stated age   Head:    Normocephalic, without obvious abnormality, atraumatic   Eyes:    conjunctiva/corneas clear   Nose:   Nares normal, septum midline, mucosa normal, no drainage   Throat:   Lips, teeth and gums normal   Neck:   Supple,  symmetrical, trachea midline, no carotid    bruit or JVD   Lungs:     Clear to auscultation bilaterally, respirations unlabored   Chest Wall:    No tenderness or deformity    Heart:    Regular rate and rhythm, S1 and S2 normal, no murmur, rub   or gallop, normal carotid impulse bilaterally without bruit.   Abdomen:     Soft, non-tender   Extremities:   Extremities normal, atraumatic, no cyanosis or edema   Pulses:   2+ and symmetric all extremities   Skin:   Skin color, texture, turgor normal, no rashes or lesions       Diagnostic Data:  Procedures  Lab Results   Component Value Date    TRIG 97 02/26/2019    HDL 45 02/26/2019     Lab Results   Component Value Date    GLUCOSE 87 02/26/2019    BUN 18 02/26/2019    CREATININE 1.10 02/26/2019     02/26/2019    K 4.5 02/26/2019     (H) 02/26/2019    CO2 24.0 (L) 02/26/2019     No results found for: HGBA1C  Lab Results   Component Value Date    WBC 6.16 08/26/2018    HGB 15.8 08/26/2018    HCT 46.9 08/26/2018     08/26/2018       Allergies  No Known Allergies    Current Medications    Current Outpatient Medications:   •  aspirin 81 MG EC tablet, Take 1 tablet by mouth Daily., Disp: 30 tablet, Rfl: 0  •  atorvastatin (LIPITOR) 80 MG tablet, Take 1 tablet by mouth Every Night., Disp: 90 tablet, Rfl: 1  •  metoprolol tartrate (LOPRESSOR) 25 MG tablet, Take 0.5 tablets by mouth Every 12 (Twelve) Hours., Disp: 90 tablet, Rfl: 1  •  sulfamethoxazole-trimethoprim (BACTRIM,SEPTRA) 400-80 MG tablet, Take 1 tablet by mouth 2 (Two) Times a Day., Disp: , Rfl:   •  tamsulosin (FLOMAX) 0.4 MG capsule 24 hr capsule, Take 1 capsule by mouth Every Night., Disp: , Rfl:           ROS  Review of Systems   Cardiovascular: Positive for irregular heartbeat. Negative for chest pain, dyspnea on exertion, leg swelling, near-syncope and palpitations.   Respiratory: Negative for cough, shortness of breath and sputum production.        SOCIAL HX  Social History     Socioeconomic  History   • Marital status:      Spouse name: Not on file   • Number of children: Not on file   • Years of education: Not on file   • Highest education level: Not on file   Social Needs   • Financial resource strain: Not on file   • Food insecurity - worry: Not on file   • Food insecurity - inability: Not on file   • Transportation needs - medical: Not on file   • Transportation needs - non-medical: Not on file   Occupational History   • Not on file   Tobacco Use   • Smoking status: Never Smoker   • Smokeless tobacco: Never Used   Substance and Sexual Activity   • Alcohol use: Yes     Alcohol/week: 0.6 oz     Types: 1 Cans of beer per week     Comment: A BEER A DAY   • Drug use: No   • Sexual activity: Defer   Other Topics Concern   • Not on file   Social History Narrative   • Not on file       FAMILY HX  Family History   Problem Relation Age of Onset   • No Known Problems Mother    • No Known Problems Father              Oscar Anderson III, MD, FACC

## 2019-04-02 ENCOUNTER — TELEPHONE (OUTPATIENT)
Dept: CARDIOLOGY | Facility: CLINIC | Age: 79
End: 2019-04-02

## 2019-04-02 NOTE — TELEPHONE ENCOUNTER
Salima at Dr.John Coley's office ([facility]) is requesting cardiac clearance for upcoming eye surgery. Please advise.

## 2019-09-09 ENCOUNTER — OFFICE VISIT (OUTPATIENT)
Dept: CARDIOLOGY | Facility: CLINIC | Age: 79
End: 2019-09-09

## 2019-09-09 VITALS
HEART RATE: 83 BPM | OXYGEN SATURATION: 96 % | SYSTOLIC BLOOD PRESSURE: 118 MMHG | HEIGHT: 65 IN | DIASTOLIC BLOOD PRESSURE: 82 MMHG | WEIGHT: 195 LBS | BODY MASS INDEX: 32.49 KG/M2

## 2019-09-09 DIAGNOSIS — I10 ESSENTIAL HYPERTENSION: ICD-10-CM

## 2019-09-09 DIAGNOSIS — I65.23 CAROTID STENOSIS, ASYMPTOMATIC, BILATERAL: ICD-10-CM

## 2019-09-09 DIAGNOSIS — E78.2 MIXED HYPERLIPIDEMIA: ICD-10-CM

## 2019-09-09 DIAGNOSIS — I25.10 CORONARY ARTERY DISEASE INVOLVING NATIVE CORONARY ARTERY OF NATIVE HEART WITHOUT ANGINA PECTORIS: Primary | ICD-10-CM

## 2019-09-09 PROCEDURE — 99214 OFFICE O/P EST MOD 30 MIN: CPT | Performed by: INTERNAL MEDICINE

## 2019-09-09 RX ORDER — FINASTERIDE 1 MG/1
5 TABLET, FILM COATED ORAL DAILY
COMMUNITY
End: 2020-09-14

## 2019-09-09 RX ORDER — LOSARTAN POTASSIUM 50 MG/1
50 TABLET ORAL DAILY
Qty: 90 TABLET | Refills: 3 | Status: SHIPPED | OUTPATIENT
Start: 2019-09-09 | End: 2020-03-11

## 2019-09-09 NOTE — PROGRESS NOTES
[facility] at [facility]  Office visit  Harpreet Ceballos  1940    There is no work phone number on file.    VISIT DATE:  9/9/2019      PCP: Provider, No Known  [facility] [id number]    CC:  Chief Complaint   Patient presents with   • Coronary Artery Disease       Previous cardiac studies and procedures:  August 2018  Echo  · Calculated EF = 50%. Estimated EF appears to be in the range of 51 - 55%.  · Left ventricular wall thickness is consistent with mild concentric hypertrophy.  · Mild to moderate aortic valve regurgitation is present.  · Mild dilation of the ascending aorta is present. 4 cm.  Myocardial perfusion imaging  · REST EF = 38% STRESS EF = 34%.  · Left ventricular ejection fraction is moderately reduced and severely dilated.  · Moderate size region of moderate ischemia involving the anterior wall and apex.  · Large region of ischemia involving the entire lateral wall, significantly decreased at rest well potentially consistent for underlying infarction versus hibernating myocardium  · Impressions are consistent with a high risk study. Concerning for multivessel disease.  Cardiac catheterization  · LM: Dilated as a continuation of dilated sinuses of Valsalva.    · LAD: 70% ostial stenosis, 60% proximal stenosis and a long 80% midsegment stenosis.  The distal vessel has diffuse plaque.  A medium size first diagonal has 90% stenosis.  · LCX: Nondominant vessel with 70% proximal stenosis and total mid segment occlusion.  A major obtuse marginal branch is also totally occluded in its midsegment.  The lateral filling of the obtuse marginal and the distal circumflex is seen via left-sided collaterals.  · RCA: Dominant vessel with a 90% distal stenosis.  · Three-vessel obstructive coronary disease.  CT angio chest  dilatation of the aortic root measuring up to 4.3 cm   CTA neck  Atherosclerotic involvement of the bilateral carotid bulbs far greater  on the right with  approximately 80% luminal narrowing by NASCET  Criteria.    September 2018   -coronary bypass grafting: LIMA to first diagonal and left anterior descending artery, vein graft to obtuse marginal one and obtuse marginal 2, vein graft to right posterior descending artery.    March 2019 echo  · Estimated EF appears to be in the range of 56 - 60%.  · Mild dilation of the sinuses of Valsalva, 4cm. Mild dilation of the ascending aorta, 4.3cm.  · Mild aortic valve regurgitation is present.  · Elevated left atrial pressure.    ASSESSMENT:   Diagnosis Plan   1. Coronary artery disease involving native coronary artery of native heart without angina pectoris     2. Essential hypertension     3. Mixed hyperlipidemia     4. Carotid stenosis, asymptomatic, bilateral         PLAN:  Coronary artery disease: Stable and asymptomatic.  Continue aspirin, statin and afterload reduction  Hyperlipidemia: Currently with excellent control.  Goal LDL less than 70.  Continue high intensity statin therapy.  Repeat fasting lipid panel pending.  Carotid stenosis, bilateral: Continue aggressive risk factor modification.  Annual carotid ultrasound imaging pending.  Ascending aortic aneurysm: Goal blood pressure less than 130/80 mmHg.  Continue current medical therapy.  Will be able to follow with serial transthoracic echocardiography.    Subjective  No limitations, denies dyspnea on exertion or chest pain..  Intermittent focal h lower precordial chest discomfort when he bends over at the waist.  No exertional chest discomfort.  E is compliant with medical therapy.  Denies easy bruising or bleeding complications on low-dose aspirin.  No myalgias on statin therapy.  Blood pressures running less than 130/80 mmHg.  Reviewed most recent fasting lipid panel.  Has follow-up with Dr. Amaro next week.    PHYSICAL EXAMINATION:  Vitals:    09/09/19 0938   BP: 118/82   BP Location: Right arm   Patient Position: Sitting   Pulse: 83   SpO2: 96%   Weight: 88.5  "kg (195 lb)   Height: 165.1 cm (65\")     General Appearance:    Alert, cooperative, no distress, appears stated age   Head:    Normocephalic, without obvious abnormality, atraumatic   Eyes:    conjunctiva/corneas clear   Nose:   Nares normal, septum midline, mucosa normal, no drainage   Throat:   Lips, teeth and gums normal   Neck:   Supple, symmetrical, trachea midline, no carotid    bruit or JVD   Lungs:     Clear to auscultation bilaterally, respirations unlabored   Chest Wall:    No tenderness or deformity    Heart:   Regular rate and rhythm, no murmurs rubs gallops, normal S1-S2.  Bilateral carotid bruits.   Abdomen:     Soft, non-tender   Extremities:   Extremities normal, atraumatic, no cyanosis or edema   Pulses:   2+ and symmetric all extremities   Skin:   Skin color, texture, turgor normal, no rashes or lesions       Diagnostic Data:  Procedures  Lab Results   Component Value Date    TRIG 97 02/26/2019    HDL 45 02/26/2019     Lab Results   Component Value Date    GLUCOSE 87 02/26/2019    BUN 18 02/26/2019    CREATININE 1.10 02/26/2019     02/26/2019    K 4.5 02/26/2019     (H) 02/26/2019    CO2 24.0 (L) 02/26/2019     No results found for: HGBA1C  Lab Results   Component Value Date    WBC 6.16 08/26/2018    HGB 15.8 08/26/2018    HCT 46.9 08/26/2018     08/26/2018       Allergies  No Known Allergies    Current Medications    Current Outpatient Medications:   •  aspirin 81 MG EC tablet, Take 1 tablet by mouth Daily., Disp: 30 tablet, Rfl: 0  •  atorvastatin (LIPITOR) 80 MG tablet, Take 1 tablet by mouth Every Night., Disp: 90 tablet, Rfl: 1  •  finasteride (PROPECIA) 1 MG tablet, Take 5 mg by mouth Daily., Disp: , Rfl:   •  losartan (COZAAR) 50 MG tablet, Take 1 tablet by mouth Daily., Disp: 30 tablet, Rfl: 6  •  tamsulosin (FLOMAX) 0.4 MG capsule 24 hr capsule, Take 2 capsules by mouth Every Night., Disp: , Rfl:           ROS  Review of Systems   Cardiovascular: Positive for irregular " heartbeat. Negative for chest pain, dyspnea on exertion, leg swelling, near-syncope and palpitations.   Respiratory: Negative for cough, shortness of breath and sputum production.        SOCIAL HX  Social History     Socioeconomic History   • Marital status:      Spouse name: Not on file   • Number of children: Not on file   • Years of education: Not on file   • Highest education level: Not on file   Tobacco Use   • Smoking status: Never Smoker   • Smokeless tobacco: Never Used   Substance and Sexual Activity   • Alcohol use: Yes     Alcohol/week: 0.6 oz     Types: 1 Cans of beer per week     Comment: A BEER A DAY   • Drug use: No   • Sexual activity: Defer       FAMILY HX  Family History   Problem Relation Age of Onset   • No Known Problems Mother    • No Known Problems Father              Oscar Anderson III, MD, FACC

## 2019-09-18 ENCOUNTER — APPOINTMENT (OUTPATIENT)
Dept: LAB | Facility: HOSPITAL | Age: 79
End: 2019-09-18

## 2019-09-18 LAB
ALBUMIN SERPL-MCNC: 4.8 G/DL (ref 3.5–5.2)
ALBUMIN/GLOB SERPL: 1.6 G/DL
ALP SERPL-CCNC: 77 U/L (ref 39–117)
ALT SERPL W P-5'-P-CCNC: 13 U/L (ref 1–41)
ANION GAP SERPL CALCULATED.3IONS-SCNC: 15.9 MMOL/L (ref 5–15)
AST SERPL-CCNC: 16 U/L (ref 1–40)
BILIRUB SERPL-MCNC: 0.9 MG/DL (ref 0.2–1.2)
BUN BLD-MCNC: 18 MG/DL (ref 8–23)
BUN/CREAT SERPL: 16.5 (ref 7–25)
CALCIUM SPEC-SCNC: 9.2 MG/DL (ref 8.6–10.5)
CHLORIDE SERPL-SCNC: 107 MMOL/L (ref 98–107)
CHOLEST SERPL-MCNC: 212 MG/DL (ref 0–200)
CO2 SERPL-SCNC: 20.1 MMOL/L (ref 22–29)
CREAT BLD-MCNC: 1.09 MG/DL (ref 0.76–1.27)
GFR SERPL CREATININE-BSD FRML MDRD: 65 ML/MIN/1.73
GLOBULIN UR ELPH-MCNC: 3 GM/DL
GLUCOSE BLD-MCNC: 110 MG/DL (ref 65–99)
HDLC SERPL-MCNC: 50 MG/DL (ref 40–60)
LDLC SERPL CALC-MCNC: 133 MG/DL (ref 0–100)
LDLC/HDLC SERPL: 2.67 {RATIO}
POTASSIUM BLD-SCNC: 4.7 MMOL/L (ref 3.5–5.2)
PROT SERPL-MCNC: 7.8 G/DL (ref 6–8.5)
SODIUM BLD-SCNC: 143 MMOL/L (ref 136–145)
TRIGL SERPL-MCNC: 143 MG/DL (ref 0–150)
VLDLC SERPL-MCNC: 28.6 MG/DL (ref 5–40)

## 2019-09-18 PROCEDURE — 80061 LIPID PANEL: CPT | Performed by: INTERNAL MEDICINE

## 2019-09-18 PROCEDURE — 80053 COMPREHEN METABOLIC PANEL: CPT | Performed by: INTERNAL MEDICINE

## 2019-09-18 PROCEDURE — 36415 COLL VENOUS BLD VENIPUNCTURE: CPT | Performed by: INTERNAL MEDICINE

## 2019-09-23 ENCOUNTER — TELEPHONE (OUTPATIENT)
Dept: CARDIOLOGY | Facility: CLINIC | Age: 79
End: 2019-09-23

## 2019-09-23 RX ORDER — EZETIMIBE 10 MG/1
10 TABLET ORAL DAILY
Qty: 90 TABLET | Refills: 3 | Status: SHIPPED | OUTPATIENT
Start: 2019-09-23 | End: 2020-03-11

## 2019-09-23 RX ORDER — ATORVASTATIN CALCIUM 80 MG/1
80 TABLET, FILM COATED ORAL NIGHTLY
Qty: 90 TABLET | Refills: 1 | Status: SHIPPED | OUTPATIENT
Start: 2019-09-23 | End: 2020-03-11

## 2019-09-23 NOTE — TELEPHONE ENCOUNTER
Spoke with patient and asked him if he was still taking atorvastatin 80 mg daily.    Patient stated that he did back off of it for a short period of time, due to loss of appetite. HE said he was trying to figure out which medication was causing that side effect. He said he was off of the medication for about 2 weeks before restarting.    ----------------------------------------------------------    Per JWL, please add Zetia 10 mg daily.    Patient verbalized understanding and agreed to plan

## 2019-09-23 NOTE — TELEPHONE ENCOUNTER
----- Message from Oscar Anderson III, MD sent at 9/19/2019  6:44 AM EDT -----  We need to make sure he is still taking atorvastatin 80 mg p.o. daily.  His LDL jumped up quite significantly compared to previous.

## 2019-12-04 ENCOUNTER — TELEPHONE (OUTPATIENT)
Dept: CARDIOLOGY | Facility: CLINIC | Age: 79
End: 2019-12-04

## 2019-12-04 DIAGNOSIS — I65.23 CAROTID STENOSIS, ASYMPTOMATIC, BILATERAL: Primary | ICD-10-CM

## 2019-12-04 RX ORDER — CLOPIDOGREL BISULFATE 75 MG/1
75 TABLET ORAL DAILY
Qty: 21 TABLET | Refills: 0 | Status: SHIPPED | OUTPATIENT
Start: 2019-12-04 | End: 2020-03-11

## 2019-12-04 NOTE — TELEPHONE ENCOUNTER
Regarding: Non-Urgent Medical Question  Contact: [phone]  ----- Message from Mychart, Generic sent at 12/4/2019  7:45 AM EST -----    I am Harpreet Ceballos  I had a little incident the other day and I was wondering if it was of any great concern. I temporarily lost some of my strength and motor control in my left arm. I could do most functions with my arm and hand but with limited abilities. For example: my  was lacking. I couldn’t  my Yelitza tablet firm enough. It kept slipping out of my hand. Also, I couldn’t type with my left hand. This only lasted for about a half an hour and everything is back to normal.  I didn’t think to monitor my vitals and I should have. But my blood pressure has been traditionally running at about 110 to 135 for the high and below 80 for the low. My Oxygen level is running above 95. My pulse rate was around 72.

## 2019-12-04 NOTE — TELEPHONE ENCOUNTER
Regarding: Non-Urgent Medical Question  Contact: [phone]  ----- Message from [place], Generic sent at 12/4/2019  7:47 AM EST -----    Part 2 (Our messages can only be 750 characters)  The medicines that I am presently taking are:  Tamulosin  0.4 mg two each once a day  Finastride 5 mg once a day   Atorvastatin 80 mg once a day  Aspirin once a day  Losartan 50 mg once a day (This one I have been on and off again. It seems to raise my blood pressure when I take it.  It sometimes goes as high as 144 and never below 133 when I take it.)  My Cholesterol was checked last Sept and it ran 215. Dr. Anderson started me on Losartan due to the Cholesterol level. I have since started even a lower Cholesterol diet but have not checked it since.   I really don’t think this is much to do but you are the Doctors and should decide that.  CC: Dr. Amaro, Dr. Anderson

## 2019-12-04 NOTE — TELEPHONE ENCOUNTER
Concerning for left MCA territory TIA, known significant right carotid stenosis based on CTA.  Continue current medical therapy with addition of Plavix 75 mg p.o. daily for the next 3 weeks.  Carotid duplex pending, will likely need referral for either percutaneous or surgical revascularization.

## 2019-12-04 NOTE — TELEPHONE ENCOUNTER
Called patient to report message above from . Wife stated he went to  ED today for weakness. He dropped his computer. Told her to call us when he returned home for instructions from . She verbalized understanding. notified of patient at  ED today.

## 2019-12-05 NOTE — TELEPHONE ENCOUNTER
Patient notified of message above from . Stated they did testing and it was negative and they gave him Plavix 75 mg daily for 3 weeks. Requested records from [facility].

## 2019-12-17 ENCOUNTER — HOSPITAL ENCOUNTER (OUTPATIENT)
Dept: CARDIOLOGY | Facility: HOSPITAL | Age: 79
Discharge: HOME | End: 2019-12-17
Admitting: INTERNAL MEDICINE

## 2019-12-17 VITALS
BODY MASS INDEX: 32.49 KG/M2 | HEIGHT: 65 IN | DIASTOLIC BLOOD PRESSURE: 67 MMHG | SYSTOLIC BLOOD PRESSURE: 118 MMHG | WEIGHT: 195 LBS

## 2019-12-17 DIAGNOSIS — I65.23 CAROTID STENOSIS, ASYMPTOMATIC, BILATERAL: ICD-10-CM

## 2019-12-17 PROCEDURE — 93880 EXTRACRANIAL BILAT STUDY: CPT | Performed by: INTERNAL MEDICINE

## 2019-12-17 PROCEDURE — 93880 EXTRACRANIAL BILAT STUDY: CPT

## 2019-12-18 ENCOUNTER — TELEPHONE (OUTPATIENT)
Dept: CARDIOLOGY | Facility: CLINIC | Age: 79
End: 2019-12-18

## 2019-12-18 DIAGNOSIS — E78.2 MIXED HYPERLIPIDEMIA: Primary | ICD-10-CM

## 2019-12-18 LAB
BH CV ECHO MEAS - BSA(HAYCOCK): 2 M^2
BH CV ECHO MEAS - BSA: 2 M^2
BH CV ECHO MEAS - BZI_BMI: 32.4 KILOGRAMS/M^2
BH CV ECHO MEAS - BZI_METRIC_HEIGHT: 165.1 CM
BH CV ECHO MEAS - BZI_METRIC_WEIGHT: 88.5 KG
BH CV XLRA MEAS LEFT CCA RATIO VEL: 108 CM/SEC
BH CV XLRA MEAS LEFT DIST CCA EDV: 19.6 CM/SEC
BH CV XLRA MEAS LEFT DIST CCA PSV: 91.3 CM/SEC
BH CV XLRA MEAS LEFT DIST ICA EDV: 23.6 CM/SEC
BH CV XLRA MEAS LEFT DIST ICA PSV: 78.6 CM/SEC
BH CV XLRA MEAS LEFT ICA RATIO VEL: 108 CM/SEC
BH CV XLRA MEAS LEFT ICA/CCA RATIO: 1
BH CV XLRA MEAS LEFT MID CCA EDV: 18.7 CM/SEC
BH CV XLRA MEAS LEFT MID CCA PSV: 108 CM/SEC
BH CV XLRA MEAS LEFT MID ICA EDV: 24.6 CM/SEC
BH CV XLRA MEAS LEFT MID ICA PSV: 94.3 CM/SEC
BH CV XLRA MEAS LEFT PROX CCA EDV: 16.8 CM/SEC
BH CV XLRA MEAS LEFT PROX CCA PSV: 128 CM/SEC
BH CV XLRA MEAS LEFT PROX ECA PSV: 283 CM/SEC
BH CV XLRA MEAS LEFT PROX ICA EDV: 23.6 CM/SEC
BH CV XLRA MEAS LEFT PROX ICA PSV: 108 CM/SEC
BH CV XLRA MEAS LEFT PROX SCLA PSV: 204 CM/SEC
BH CV XLRA MEAS LEFT VERTEBRAL A EDV: 11.2 CM/SEC
BH CV XLRA MEAS LEFT VERTEBRAL A PSV: 67 CM/SEC
BH CV XLRA MEAS RIGHT CCA RATIO VEL: 85.6 CM/SEC
BH CV XLRA MEAS RIGHT DIST CCA EDV: 13.4 CM/SEC
BH CV XLRA MEAS RIGHT DIST CCA PSV: 85.6 CM/SEC
BH CV XLRA MEAS RIGHT DIST ICA EDV: 15.3 CM/SEC
BH CV XLRA MEAS RIGHT DIST ICA PSV: 44.1 CM/SEC
BH CV XLRA MEAS RIGHT ICA RATIO VEL: 170 CM/SEC
BH CV XLRA MEAS RIGHT ICA/CCA RATIO: 2
BH CV XLRA MEAS RIGHT MID CCA EDV: 13.4 CM/SEC
BH CV XLRA MEAS RIGHT MID CCA PSV: 82.5 CM/SEC
BH CV XLRA MEAS RIGHT MID ICA EDV: 25.1 CM/SEC
BH CV XLRA MEAS RIGHT MID ICA PSV: 134 CM/SEC
BH CV XLRA MEAS RIGHT PROX CCA PSV: 84.9 CM/SEC
BH CV XLRA MEAS RIGHT PROX ECA PSV: 175 CM/SEC
BH CV XLRA MEAS RIGHT PROX ICA EDV: 37.3 CM/SEC
BH CV XLRA MEAS RIGHT PROX ICA PSV: 170 CM/SEC
BH CV XLRA MEAS RIGHT PROX SCLA PSV: 94.3 CM/SEC
BH CV XLRA MEAS RIGHT VERTEBRAL A EDV: 5.5 CM/SEC
BH CV XLRA MEAS RIGHT VERTEBRAL A PSV: 26.9 CM/SEC
LEFT ARM BP: NORMAL MMHG
RIGHT ARM BP: NORMAL MMHG

## 2019-12-18 NOTE — TELEPHONE ENCOUNTER
----- Message from Oscar Anderson III, MD sent at 12/18/2019 11:39 AM EST -----  Moderate blockage in the right carotid.  Continue current medical therapy.  Make sure he has a fasting lipid panel prior to his follow-up in March.

## 2020-03-02 ENCOUNTER — LAB (OUTPATIENT)
Dept: LAB | Facility: HOSPITAL | Age: 80
End: 2020-03-02

## 2020-03-02 DIAGNOSIS — E78.2 MIXED HYPERLIPIDEMIA: ICD-10-CM

## 2020-03-02 LAB
CHOLEST SERPL-MCNC: 153 MG/DL (ref 0–200)
HDLC SERPL-MCNC: 42 MG/DL (ref 40–60)
LDLC SERPL CALC-MCNC: 64 MG/DL (ref 0–100)
LDLC/HDLC SERPL: 1.52 {RATIO}
TRIGL SERPL-MCNC: 236 MG/DL (ref 0–150)
VLDLC SERPL-MCNC: 47.2 MG/DL (ref 5–40)

## 2020-03-02 PROCEDURE — 80061 LIPID PANEL: CPT

## 2020-03-11 ENCOUNTER — OFFICE VISIT (OUTPATIENT)
Dept: CARDIOLOGY | Facility: CLINIC | Age: 80
End: 2020-03-11

## 2020-03-11 VITALS
SYSTOLIC BLOOD PRESSURE: 124 MMHG | BODY MASS INDEX: 31.08 KG/M2 | HEART RATE: 83 BPM | DIASTOLIC BLOOD PRESSURE: 76 MMHG | WEIGHT: 198 LBS | OXYGEN SATURATION: 97 % | HEIGHT: 67 IN

## 2020-03-11 DIAGNOSIS — E78.2 MIXED HYPERLIPIDEMIA: ICD-10-CM

## 2020-03-11 DIAGNOSIS — I65.23 CAROTID STENOSIS, ASYMPTOMATIC, BILATERAL: ICD-10-CM

## 2020-03-11 DIAGNOSIS — I10 ESSENTIAL HYPERTENSION: ICD-10-CM

## 2020-03-11 DIAGNOSIS — I25.10 CORONARY ARTERY DISEASE INVOLVING NATIVE CORONARY ARTERY OF NATIVE HEART WITHOUT ANGINA PECTORIS: Primary | ICD-10-CM

## 2020-03-11 PROCEDURE — 99214 OFFICE O/P EST MOD 30 MIN: CPT | Performed by: INTERNAL MEDICINE

## 2020-03-11 RX ORDER — ASPIRIN AND DIPYRIDAMOLE 25; 200 MG/1; MG/1
1 CAPSULE, EXTENDED RELEASE ORAL 2 TIMES DAILY
Qty: 60 CAPSULE | Refills: 5 | Status: SHIPPED | OUTPATIENT
Start: 2020-03-11 | End: 2020-03-18

## 2020-03-11 RX ORDER — ROSUVASTATIN CALCIUM 40 MG/1
40 TABLET, COATED ORAL DAILY
Qty: 90 TABLET | Refills: 1 | Status: SHIPPED | OUTPATIENT
Start: 2020-03-11 | End: 2020-09-14

## 2020-03-11 NOTE — PROGRESS NOTES
[facility] at [facility]  Office visit  Harpreet Ceballos  1940    There is no work phone number on file.    VISIT DATE:  3/11/2020      PCP: Provider, No Known  [facility] [id number]    CC:  Chief Complaint   Patient presents with   • Coronary Artery Disease       Previous cardiac studies and procedures:  August 2018  Echo  · Calculated EF = 50%. Estimated EF appears to be in the range of 51 - 55%.  · Left ventricular wall thickness is consistent with mild concentric hypertrophy.  · Mild to moderate aortic valve regurgitation is present.  · Mild dilation of the ascending aorta is present. 4 cm.  Myocardial perfusion imaging  · REST EF = 38% STRESS EF = 34%.  · Left ventricular ejection fraction is moderately reduced and severely dilated.  · Moderate size region of moderate ischemia involving the anterior wall and apex.  · Large region of ischemia involving the entire lateral wall, significantly decreased at rest well potentially consistent for underlying infarction versus hibernating myocardium  · Impressions are consistent with a high risk study. Concerning for multivessel disease.  Cardiac catheterization  · LM: Dilated as a continuation of dilated sinuses of Valsalva.    · LAD: 70% ostial stenosis, 60% proximal stenosis and a long 80% midsegment stenosis.  The distal vessel has diffuse plaque.  A medium size first diagonal has 90% stenosis.  · LCX: Nondominant vessel with 70% proximal stenosis and total mid segment occlusion.  A major obtuse marginal branch is also totally occluded in its midsegment.  The lateral filling of the obtuse marginal and the distal circumflex is seen via left-sided collaterals.  · RCA: Dominant vessel with a 90% distal stenosis.  · Three-vessel obstructive coronary disease.  CT angio chest  dilatation of the aortic root measuring up to 4.3 cm   CTA neck  Atherosclerotic involvement of the bilateral carotid bulbs far greater  on the right with  approximately 80% luminal narrowing by NASCET  Criteria.    September 2018   -coronary bypass grafting: LIMA to first diagonal and left anterior descending artery, vein graft to obtuse marginal one and obtuse marginal 2, vein graft to right posterior descending artery.    March 2019 echo  · Estimated EF appears to be in the range of 56 - 60%.  · Mild dilation of the sinuses of Valsalva, 4cm. Mild dilation of the ascending aorta, 4.3cm.  · Mild aortic valve regurgitation is present.  · Elevated left atrial pressure.    December 2019 carotid duplex  · Right internal carotid artery stenosis of 50-69%.  · Proximal left internal carotid artery plaque without significant stenosis.  · Left internal carotid artery stenosis of 0-49%.    ASSESSMENT:   Diagnosis Plan   1. Coronary artery disease involving native coronary artery of native heart without angina pectoris     2. Essential hypertension     3. Mixed hyperlipidemia     4. Carotid stenosis, asymptomatic, bilateral         PLAN:  Coronary artery disease: Stable and asymptomatic.  Continue aspirin, statin and afterload reduction    Hyperlipidemia: Currently with excellent control.  Goal LDL less than 70.  Continue high intensity statin therapy.  Repeat fasting lipid panel pending.    Carotid stenosis, bilateral: Right-sided TIA in December.  Is completed course of clopidogrel.  Switching aspirin to Aggrenox.  Switching atorvastatin to Crestor 40 mg p.o. daily.  Continue to trend closely.  Will require revascularization for any further clinical event or worsening severity through Doppler surveillance.    Ascending aortic aneurysm: Goal blood pressure less than 130/80 mmHg.  Continue current medical therapy.  Will be able to follow with serial transthoracic echocardiography.    Subjective  Presented with left hand weakness to [facility] in December.  CTA revealed a 50% right internal carotid artery stenosis, occluded right vertebral artery, and mild to moderate  "iCAD.  He was given Plavix for 3 weeks.  He had been on atorvastatin up until last week but stopped it once he saw that his cholesterol numbers had improved and he reports that it was interfering with his diet.  No further paresthesias or weakness.  Blood pressures running less than 130/80 mmHg.  He did report that he felt that the Plavix increased his blood pressure.    PHYSICAL EXAMINATION:  Vitals:    03/11/20 0935   BP: 124/76   BP Location: Right arm   Patient Position: Sitting   Pulse: 83   SpO2: 97%   Weight: 89.8 kg (198 lb)   Height: 170.2 cm (67\")     General Appearance:    Alert, cooperative, no distress, appears stated age   Head:    Normocephalic, without obvious abnormality, atraumatic   Eyes:    conjunctiva/corneas clear   Nose:   Nares normal, septum midline, mucosa normal, no drainage   Throat:   Lips, teeth and gums normal   Neck:   Supple, symmetrical, trachea midline, no carotid    bruit or JVD   Lungs:     Clear to auscultation bilaterally, respirations unlabored   Chest Wall:    No tenderness or deformity    Heart:   Regular rate and rhythm, no murmurs rubs gallops, normal S1-S2.  Bilateral carotid bruits.   Abdomen:     Soft, non-tender   Extremities:   Extremities normal, atraumatic, no cyanosis or edema   Pulses:   2+ and symmetric all extremities   Skin:   Skin color, texture, turgor normal, no rashes or lesions       Diagnostic Data:  Procedures  Lab Results   Component Value Date    TRIG 236 (H) 03/02/2020    HDL 42 03/02/2020     Lab Results   Component Value Date    GLUCOSE 110 (H) 09/18/2019    BUN 18 09/18/2019    CREATININE 1.09 09/18/2019     09/18/2019    K 4.7 09/18/2019     09/18/2019    CO2 20.1 (L) 09/18/2019     No results found for: HGBA1C  Lab Results   Component Value Date    WBC 6.16 08/26/2018    HGB 15.8 08/26/2018    HCT 46.9 08/26/2018     08/26/2018       Allergies  No Known Allergies    Current Medications    Current Outpatient Medications:   •  " finasteride (PROPECIA) 1 MG tablet, Take 5 mg by mouth Daily., Disp: , Rfl:   •  tamsulosin (FLOMAX) 0.4 MG capsule 24 hr capsule, Take 2 capsules by mouth Every Night., Disp: , Rfl:   •  aspirin-dipyridamole (AGGRENOX)  MG per 12 hr capsule, Take 1 capsule by mouth 2 (Two) Times a Day., Disp: 60 capsule, Rfl: 5  •  rosuvastatin (CRESTOR) 40 MG tablet, Take 1 tablet by mouth Daily., Disp: 90 tablet, Rfl: 1          ROS  Review of Systems   Cardiovascular: Positive for irregular heartbeat. Negative for chest pain, dyspnea on exertion, leg swelling, near-syncope and palpitations.   Respiratory: Negative for cough, shortness of breath and sputum production.        SOCIAL HX  Social History     Socioeconomic History   • Marital status:      Spouse name: Not on file   • Number of children: Not on file   • Years of education: Not on file   • Highest education level: Not on file   Tobacco Use   • Smoking status: Never Smoker   • Smokeless tobacco: Never Used   Substance and Sexual Activity   • Alcohol use: Yes     Alcohol/week: 1.0 standard drinks     Types: 1 Cans of beer per week     Comment: A BEER A DAY   • Drug use: No   • Sexual activity: Defer       FAMILY HX  Family History   Problem Relation Age of Onset   • No Known Problems Mother    • No Known Problems Father              Oscar Anderson III, MD, FACC

## 2020-03-18 ENCOUNTER — TELEPHONE (OUTPATIENT)
Dept: CARDIOLOGY | Facility: CLINIC | Age: 80
End: 2020-03-18

## 2020-03-18 RX ORDER — CLOPIDOGREL BISULFATE 75 MG/1
75 TABLET ORAL DAILY
Qty: 30 TABLET | Refills: 5 | Status: SHIPPED | OUTPATIENT
Start: 2020-03-18 | End: 2020-09-14

## 2020-03-18 NOTE — TELEPHONE ENCOUNTER
----- Message from Harpreet Ceballos sent at 3/18/2020 10:03 AM EDT -----  Regarding: Prescription Question  Contact: [phone]  Last week Dr. Anderson prescribed ASPIRIN-DIPYRIDAMOL. I guess this is an AGRENOX brand. The cost is $134.79 for 60.   The Pharmacist says that CLOPIDOGREL (PLAVIX) is the same thing.  You have already prescribed that and I have some left. The cost to fill that prescription was around $7.00.  Please advise.

## 2020-03-18 NOTE — TELEPHONE ENCOUNTER
He was previously concerned that clopidogrel had possibly raised his blood pressure.  Reasonable to start clopidogrel 75 mg p.o. daily and trend home blood pressures.

## 2020-09-09 ENCOUNTER — LAB (OUTPATIENT)
Dept: LAB | Facility: HOSPITAL | Age: 80
End: 2020-09-09

## 2020-09-09 DIAGNOSIS — E78.2 MIXED HYPERLIPIDEMIA: ICD-10-CM

## 2020-09-10 ENCOUNTER — LAB (OUTPATIENT)
Dept: LAB | Facility: HOSPITAL | Age: 80
End: 2020-09-10

## 2020-09-10 DIAGNOSIS — E78.2 MIXED HYPERLIPIDEMIA: Primary | ICD-10-CM

## 2020-09-10 LAB
ALBUMIN SERPL-MCNC: 4 G/DL (ref 3.5–5.2)
ALBUMIN/GLOB SERPL: 1.2 G/DL
ALP SERPL-CCNC: 83 U/L (ref 39–117)
ALT SERPL W P-5'-P-CCNC: 23 U/L (ref 1–41)
ANION GAP SERPL CALCULATED.3IONS-SCNC: 13.4 MMOL/L (ref 5–15)
AST SERPL-CCNC: 18 U/L (ref 1–40)
BILIRUB SERPL-MCNC: 0.6 MG/DL (ref 0–1.2)
BUN SERPL-MCNC: 18 MG/DL (ref 8–23)
BUN/CREAT SERPL: 16.5 (ref 7–25)
CALCIUM SPEC-SCNC: 10 MG/DL (ref 8.6–10.5)
CHLORIDE SERPL-SCNC: 105 MMOL/L (ref 98–107)
CHOLEST SERPL-MCNC: 188 MG/DL (ref 0–200)
CO2 SERPL-SCNC: 20.6 MMOL/L (ref 22–29)
CREAT SERPL-MCNC: 1.09 MG/DL (ref 0.76–1.27)
GFR SERPL CREATININE-BSD FRML MDRD: 65 ML/MIN/1.73
GLOBULIN UR ELPH-MCNC: 3.4 GM/DL
GLUCOSE SERPL-MCNC: 93 MG/DL (ref 65–99)
HDLC SERPL-MCNC: 43 MG/DL (ref 40–60)
LDLC SERPL CALC-MCNC: 102 MG/DL (ref 0–100)
LDLC/HDLC SERPL: 2.37 {RATIO}
POTASSIUM SERPL-SCNC: 4.2 MMOL/L (ref 3.5–5.2)
PROT SERPL-MCNC: 7.4 G/DL (ref 6–8.5)
SODIUM SERPL-SCNC: 139 MMOL/L (ref 136–145)
TRIGL SERPL-MCNC: 216 MG/DL (ref 0–150)
VLDLC SERPL-MCNC: 43.2 MG/DL (ref 5–40)

## 2020-09-10 PROCEDURE — 80061 LIPID PANEL: CPT

## 2020-09-10 PROCEDURE — 80053 COMPREHEN METABOLIC PANEL: CPT

## 2020-09-10 PROCEDURE — 36415 COLL VENOUS BLD VENIPUNCTURE: CPT

## 2020-09-14 ENCOUNTER — OFFICE VISIT (OUTPATIENT)
Dept: CARDIOLOGY | Facility: CLINIC | Age: 80
End: 2020-09-14

## 2020-09-14 VITALS
BODY MASS INDEX: 30.95 KG/M2 | OXYGEN SATURATION: 95 % | HEART RATE: 90 BPM | WEIGHT: 197.2 LBS | SYSTOLIC BLOOD PRESSURE: 160 MMHG | HEIGHT: 67 IN | DIASTOLIC BLOOD PRESSURE: 96 MMHG

## 2020-09-14 DIAGNOSIS — I71.20 THORACIC AORTIC ANEURYSM WITHOUT RUPTURE (HCC): ICD-10-CM

## 2020-09-14 DIAGNOSIS — I65.23 CAROTID STENOSIS, ASYMPTOMATIC, BILATERAL: ICD-10-CM

## 2020-09-14 DIAGNOSIS — I10 ESSENTIAL HYPERTENSION: ICD-10-CM

## 2020-09-14 DIAGNOSIS — E78.2 MIXED HYPERLIPIDEMIA: ICD-10-CM

## 2020-09-14 DIAGNOSIS — I25.10 CORONARY ARTERY DISEASE INVOLVING NATIVE CORONARY ARTERY OF NATIVE HEART WITHOUT ANGINA PECTORIS: Primary | ICD-10-CM

## 2020-09-14 PROCEDURE — 99214 OFFICE O/P EST MOD 30 MIN: CPT | Performed by: INTERNAL MEDICINE

## 2020-09-14 RX ORDER — ASPIRIN 81 MG/1
81 TABLET ORAL DAILY
Start: 2020-09-14

## 2020-09-14 RX ORDER — ROSUVASTATIN CALCIUM 40 MG/1
40 TABLET, COATED ORAL DAILY
Qty: 90 TABLET | Refills: 1 | Status: SHIPPED | OUTPATIENT
Start: 2020-09-14 | End: 2021-04-21

## 2020-09-14 NOTE — PROGRESS NOTES
[facility] at [facility]  Office visit  Harpreet Ceballos  1940    There is no work phone number on file.    VISIT DATE:  9/14/2020      PCP: Provider, No Known  [facility] [id number]    CC:  Chief Complaint   Patient presents with   • Coronary Artery Disease     f/u       Previous cardiac studies and procedures:  August 2018  Echo  · Calculated EF = 50%. Estimated EF appears to be in the range of 51 - 55%.  · Left ventricular wall thickness is consistent with mild concentric hypertrophy.  · Mild to moderate aortic valve regurgitation is present.  · Mild dilation of the ascending aorta is present. 4 cm.  Myocardial perfusion imaging  · REST EF = 38% STRESS EF = 34%.  · Left ventricular ejection fraction is moderately reduced and severely dilated.  · Moderate size region of moderate ischemia involving the anterior wall and apex.  · Large region of ischemia involving the entire lateral wall, significantly decreased at rest well potentially consistent for underlying infarction versus hibernating myocardium  · Impressions are consistent with a high risk study. Concerning for multivessel disease.  Cardiac catheterization  · LM: Dilated as a continuation of dilated sinuses of Valsalva.    · LAD: 70% ostial stenosis, 60% proximal stenosis and a long 80% midsegment stenosis.  The distal vessel has diffuse plaque.  A medium size first diagonal has 90% stenosis.  · LCX: Nondominant vessel with 70% proximal stenosis and total mid segment occlusion.  A major obtuse marginal branch is also totally occluded in its midsegment.  The lateral filling of the obtuse marginal and the distal circumflex is seen via left-sided collaterals.  · RCA: Dominant vessel with a 90% distal stenosis.  · Three-vessel obstructive coronary disease.  CT angio chest  dilatation of the aortic root measuring up to 4.3 cm   CTA neck  Atherosclerotic involvement of the bilateral carotid bulbs far greater  on the right  with approximately 80% luminal narrowing by NASCET  Criteria.    September 2018   -coronary bypass grafting: LIMA to first diagonal and left anterior descending artery, vein graft to obtuse marginal one and obtuse marginal 2, vein graft to right posterior descending artery.    March 2019 echo  · Estimated EF appears to be in the range of 56 - 60%.  · Mild dilation of the sinuses of Valsalva, 4cm. Mild dilation of the ascending aorta, 4.3cm.  · Mild aortic valve regurgitation is present.  · Elevated left atrial pressure.    December 2019 carotid duplex  · Right internal carotid artery stenosis of 50-69%.  · Proximal left internal carotid artery plaque without significant stenosis.  · Left internal carotid artery stenosis of 0-49%.    ASSESSMENT:   Diagnosis Plan   1. Coronary artery disease involving native coronary artery of native heart without angina pectoris     2. Essential hypertension     3. Mixed hyperlipidemia     4. Carotid stenosis, asymptomatic, bilateral         PLAN:  Coronary artery disease: Stable and asymptomatic.  Continue aspirin and statin.    Hyperlipidemia: Currently with excellent control.  Goal LDL less than 70.  Restarting high intensity statin therapy.     Carotid stenosis, bilateral: Right-sided TIA in December.   Will require revascularization for any further clinical event or worsening severity through Doppler surveillance.  Carotid duplex pending prior to follow-up in 6 months.    Ascending aortic aneurysm: Goal blood pressure less than 130/80 mmHg.  Continue current medical therapy.  Will be able to follow with serial transthoracic echocardiography.  Transthoracic echo pending prior to follow-up in 6 months.    Subjective  Presented with left hand weakness to [facility] in December 2019.  CTA revealed a 50% right internal carotid artery stenosis, occluded right vertebral artery, and mild to moderate iCAD.  Stopped his rosuvastatin about a month prior to his recent labs to  "see where his numbers fell out off medical therapy.  Has been taking an aspirin a day.  Stopped taking Plavix because he felt like it was raising his diastolic blood pressure.  Otherwise reports stable functional capacity.  Blood pressures running less than 140/90 mmHg.  Maintaining active lifestyle without limitation.    PHYSICAL EXAMINATION:  Vitals:    09/14/20 1418   BP: 160/96   BP Location: Right arm   Patient Position: Sitting   Pulse: 90   SpO2: 95%   Weight: 89.4 kg (197 lb 3.2 oz)   Height: 170.2 cm (67\")     General Appearance:    Alert, cooperative, no distress, appears stated age   Head:    Normocephalic, without obvious abnormality, atraumatic   Eyes:    conjunctiva/corneas clear   Nose:   Nares normal, septum midline, mucosa normal, no drainage   Throat:   Lips, teeth and gums normal   Neck:   Supple, symmetrical, trachea midline, no carotid    bruit or JVD   Lungs:     Clear to auscultation bilaterally, respirations unlabored   Chest Wall:    No tenderness or deformity    Heart:   Regular rate and rhythm, no murmurs rubs gallops, normal S1-S2.  Bilateral carotid bruits.   Abdomen:     Soft, non-tender   Extremities:   Extremities normal, atraumatic, no cyanosis or edema   Pulses:   2+ and symmetric all extremities   Skin:   Skin color, texture, turgor normal, no rashes or lesions       Diagnostic Data:  Procedures  Lab Results   Component Value Date    TRIG 216 (H) 09/10/2020    HDL 43 09/10/2020     Lab Results   Component Value Date    GLUCOSE 93 09/10/2020    BUN 18 09/10/2020    CREATININE 1.09 09/10/2020     09/10/2020    K 4.2 09/10/2020     09/10/2020    CO2 20.6 (L) 09/10/2020     No results found for: HGBA1C  Lab Results   Component Value Date    WBC 6.16 08/26/2018    HGB 15.8 08/26/2018    HCT 46.9 08/26/2018     08/26/2018       Allergies  No Known Allergies    Current Medications  No current outpatient medications on file.          ROS  Review of Systems "   Cardiovascular: Positive for irregular heartbeat. Negative for chest pain, dyspnea on exertion, leg swelling, near-syncope and palpitations.   Respiratory: Negative for cough, shortness of breath and sputum production.        SOCIAL HX  Social History     Socioeconomic History   • Marital status:      Spouse name: Not on file   • Number of children: Not on file   • Years of education: Not on file   • Highest education level: Not on file   Tobacco Use   • Smoking status: Never Smoker   • Smokeless tobacco: Never Used   Substance and Sexual Activity   • Alcohol use: Yes     Alcohol/week: 1.0 standard drinks     Types: 1 Cans of beer per week     Comment: A BEER A DAY   • Drug use: No   • Sexual activity: Defer       FAMILY HX  Family History   Problem Relation Age of Onset   • No Known Problems Mother    • No Known Problems Father              Oscar Anderson III, MD, FACC

## 2021-03-17 ENCOUNTER — APPOINTMENT (OUTPATIENT)
Dept: CARDIOLOGY | Facility: HOSPITAL | Age: 81
End: 2021-03-17

## 2021-04-20 ENCOUNTER — HOSPITAL ENCOUNTER (OUTPATIENT)
Dept: CARDIOLOGY | Facility: HOSPITAL | Age: 81
Discharge: HOME | End: 2021-04-20

## 2021-04-20 VITALS
DIASTOLIC BLOOD PRESSURE: 94 MMHG | WEIGHT: 197 LBS | BODY MASS INDEX: 30.92 KG/M2 | SYSTOLIC BLOOD PRESSURE: 158 MMHG | HEIGHT: 67 IN

## 2021-04-20 DIAGNOSIS — I65.23 CAROTID STENOSIS, ASYMPTOMATIC, BILATERAL: ICD-10-CM

## 2021-04-20 DIAGNOSIS — I71.20 THORACIC AORTIC ANEURYSM WITHOUT RUPTURE (HCC): ICD-10-CM

## 2021-04-20 PROCEDURE — 93306 TTE W/DOPPLER COMPLETE: CPT

## 2021-04-20 PROCEDURE — 93880 EXTRACRANIAL BILAT STUDY: CPT

## 2021-04-20 PROCEDURE — 93880 EXTRACRANIAL BILAT STUDY: CPT | Performed by: INTERNAL MEDICINE

## 2021-04-20 PROCEDURE — 93306 TTE W/DOPPLER COMPLETE: CPT | Performed by: INTERNAL MEDICINE

## 2021-04-21 ENCOUNTER — OFFICE VISIT (OUTPATIENT)
Dept: CARDIOLOGY | Facility: CLINIC | Age: 81
End: 2021-04-21

## 2021-04-21 VITALS
OXYGEN SATURATION: 99 % | SYSTOLIC BLOOD PRESSURE: 138 MMHG | HEIGHT: 67 IN | HEART RATE: 71 BPM | WEIGHT: 187.8 LBS | DIASTOLIC BLOOD PRESSURE: 90 MMHG | BODY MASS INDEX: 29.47 KG/M2

## 2021-04-21 DIAGNOSIS — I65.23 CAROTID STENOSIS, ASYMPTOMATIC, BILATERAL: ICD-10-CM

## 2021-04-21 DIAGNOSIS — I25.10 CORONARY ARTERY DISEASE INVOLVING NATIVE CORONARY ARTERY OF NATIVE HEART WITHOUT ANGINA PECTORIS: Primary | ICD-10-CM

## 2021-04-21 DIAGNOSIS — R06.09 DYSPNEA ON EXERTION: ICD-10-CM

## 2021-04-21 DIAGNOSIS — E78.2 MIXED HYPERLIPIDEMIA: ICD-10-CM

## 2021-04-21 DIAGNOSIS — I10 ESSENTIAL HYPERTENSION: ICD-10-CM

## 2021-04-21 LAB
ASCENDING AORTA: 4 CM
BH CV ECHO MEAS - AI DEC SLOPE: 161.3 CM/SEC^2
BH CV ECHO MEAS - AI MAX PG: 37.3 MMHG
BH CV ECHO MEAS - AI MAX VEL: 305 CM/SEC
BH CV ECHO MEAS - AI P1/2T: 553.7 MSEC
BH CV ECHO MEAS - AO MAX PG (FULL): 3.3 MMHG
BH CV ECHO MEAS - AO MAX PG: 7 MMHG
BH CV ECHO MEAS - AO MEAN PG (FULL): 1.5 MMHG
BH CV ECHO MEAS - AO MEAN PG: 3 MMHG
BH CV ECHO MEAS - AO ROOT AREA (BSA CORRECTED): 2.4
BH CV ECHO MEAS - AO ROOT AREA: 18.9 CM^2
BH CV ECHO MEAS - AO V2 MAX: 132 CM/SEC
BH CV ECHO MEAS - AO V2 MEAN: 83.2 CM/SEC
BH CV ECHO MEAS - AO V2 VTI: 26.3 CM
BH CV ECHO MEAS - AVA(I,A): 6.2 CM^2
BH CV ECHO MEAS - AVA(I,D): 6.2 CM^2
BH CV ECHO MEAS - AVA(V,A): 6.6 CM^2
BH CV ECHO MEAS - AVA(V,D): 6.6 CM^2
BH CV ECHO MEAS - BSA(HAYCOCK): 2.1 M^2
BH CV ECHO MEAS - BSA: 2 M^2
BH CV ECHO MEAS - BZI_BMI: 30.9 KILOGRAMS/M^2
BH CV ECHO MEAS - BZI_METRIC_HEIGHT: 170.2 CM
BH CV ECHO MEAS - BZI_METRIC_WEIGHT: 89.4 KG
BH CV ECHO MEAS - EDV(CUBED): 71 ML
BH CV ECHO MEAS - EDV(MOD-SP2): 132 ML
BH CV ECHO MEAS - EDV(MOD-SP4): 117 ML
BH CV ECHO MEAS - EDV(TEICH): 75.9 ML
BH CV ECHO MEAS - EF(CUBED): 39.1 %
BH CV ECHO MEAS - EF(MOD-BP): 33 %
BH CV ECHO MEAS - EF(MOD-SP2): 40.2 %
BH CV ECHO MEAS - EF(MOD-SP4): 22.2 %
BH CV ECHO MEAS - EF(TEICH): 32.6 %
BH CV ECHO MEAS - ESV(CUBED): 43.2 ML
BH CV ECHO MEAS - ESV(MOD-SP2): 79 ML
BH CV ECHO MEAS - ESV(MOD-SP4): 91 ML
BH CV ECHO MEAS - ESV(TEICH): 51.2 ML
BH CV ECHO MEAS - FS: 15.2 %
BH CV ECHO MEAS - IVS/LVPW: 0.97
BH CV ECHO MEAS - IVSD: 1.5 CM
BH CV ECHO MEAS - LAD MAJOR: 6.1 CM
BH CV ECHO MEAS - LAT PEAK E' VEL: 9.2 CM/SEC
BH CV ECHO MEAS - LATERAL E/E' RATIO: 6.2
BH CV ECHO MEAS - LV DIASTOLIC VOL/BSA (35-75): 58.2 ML/M^2
BH CV ECHO MEAS - LV MASS(C)D: 241.9 GRAMS
BH CV ECHO MEAS - LV MASS(C)DI: 120.4 GRAMS/M^2
BH CV ECHO MEAS - LV MAX PG: 3.7 MMHG
BH CV ECHO MEAS - LV MEAN PG: 1.5 MMHG
BH CV ECHO MEAS - LV SYSTOLIC VOL/BSA (12-30): 45.3 ML/M^2
BH CV ECHO MEAS - LV V1 MAX: 96 CM/SEC
BH CV ECHO MEAS - LV V1 MEAN: 58.6 CM/SEC
BH CV ECHO MEAS - LV V1 VTI: 17.9 CM
BH CV ECHO MEAS - LVIDD: 4.1 CM
BH CV ECHO MEAS - LVIDS: 3.5 CM
BH CV ECHO MEAS - LVLD AP2: 8.5 CM
BH CV ECHO MEAS - LVLD AP4: 7.8 CM
BH CV ECHO MEAS - LVLS AP2: 7.9 CM
BH CV ECHO MEAS - LVLS AP4: 7.6 CM
BH CV ECHO MEAS - LVOT AREA (M): 9.1 CM^2
BH CV ECHO MEAS - LVOT AREA: 9.1 CM^2
BH CV ECHO MEAS - LVOT DIAM: 3.4 CM
BH CV ECHO MEAS - LVPWD: 1.5 CM
BH CV ECHO MEAS - MED PEAK E' VEL: 5.9 CM/SEC
BH CV ECHO MEAS - MEDIAL E/E' RATIO: 9.6
BH CV ECHO MEAS - MV A MAX VEL: 113 CM/SEC
BH CV ECHO MEAS - MV DEC SLOPE: 288 CM/SEC^2
BH CV ECHO MEAS - MV DEC TIME: 0.14 SEC
BH CV ECHO MEAS - MV E MAX VEL: 56.8 CM/SEC
BH CV ECHO MEAS - MV E/A: 0.5
BH CV ECHO MEAS - MV MAX PG: 4.4 MMHG
BH CV ECHO MEAS - MV MEAN PG: 1 MMHG
BH CV ECHO MEAS - MV P1/2T MAX VEL: 54.3 CM/SEC
BH CV ECHO MEAS - MV P1/2T: 55.2 MSEC
BH CV ECHO MEAS - MV V2 MAX: 105 CM/SEC
BH CV ECHO MEAS - MV V2 MEAN: 50.3 CM/SEC
BH CV ECHO MEAS - MV V2 VTI: 22.2 CM
BH CV ECHO MEAS - MVA P1/2T LCG: 4.1 CM^2
BH CV ECHO MEAS - MVA(P1/2T): 4 CM^2
BH CV ECHO MEAS - MVA(VTI): 7.3 CM^2
BH CV ECHO MEAS - PA ACC SLOPE: 604 CM/SEC^2
BH CV ECHO MEAS - PA ACC TIME: 0.12 SEC
BH CV ECHO MEAS - PA MAX PG: 4.4 MMHG
BH CV ECHO MEAS - PA PR(ACCEL): 26.8 MMHG
BH CV ECHO MEAS - PA V2 MAX: 103.5 CM/SEC
BH CV ECHO MEAS - RAP SYSTOLE: 3 MMHG
BH CV ECHO MEAS - RVSP: 17 MMHG
BH CV ECHO MEAS - SI(AO): 246.8 ML/M^2
BH CV ECHO MEAS - SI(CUBED): 13.8 ML/M^2
BH CV ECHO MEAS - SI(LVOT): 80.7 ML/M^2
BH CV ECHO MEAS - SI(MOD-SP2): 26.4 ML/M^2
BH CV ECHO MEAS - SI(MOD-SP4): 12.9 ML/M^2
BH CV ECHO MEAS - SI(TEICH): 12.3 ML/M^2
BH CV ECHO MEAS - SV(AO): 496 ML
BH CV ECHO MEAS - SV(CUBED): 27.7 ML
BH CV ECHO MEAS - SV(LVOT): 162.1 ML
BH CV ECHO MEAS - SV(MOD-SP2): 53 ML
BH CV ECHO MEAS - SV(MOD-SP4): 26 ML
BH CV ECHO MEAS - SV(TEICH): 24.7 ML
BH CV ECHO MEAS - TAPSE (>1.6): 0.9 CM
BH CV ECHO MEAS - TR MAX PG: 14 MMHG
BH CV ECHO MEAS - TR MAX VEL: 188 CM/SEC
BH CV ECHO MEASUREMENTS AVERAGE E/E' RATIO: 7.52
BH CV XLRA - RV BASE: 4 CM
BH CV XLRA - RV MID: 2.4 CM
BH CV XLRA MEAS LEFT DIST CCA EDV: 10.4 CM/SEC
BH CV XLRA MEAS LEFT DIST CCA PSV: 73.6 CM/SEC
BH CV XLRA MEAS LEFT DIST ICA EDV: 22.21 CM/SEC
BH CV XLRA MEAS LEFT DIST ICA PSV: 71.4 CM/SEC
BH CV XLRA MEAS LEFT MID CCA EDV: 79.04 CM/SEC
BH CV XLRA MEAS LEFT MID CCA PSV: 13.79 CM/SEC
BH CV XLRA MEAS LEFT MID ICA EDV: 22.21 CM/SEC
BH CV XLRA MEAS LEFT MID ICA PSV: 75.61 CM/SEC
BH CV XLRA MEAS LEFT PROX CCA EDV: 19.82 CM/SEC
BH CV XLRA MEAS LEFT PROX CCA PSV: 99.93 CM/SEC
BH CV XLRA MEAS LEFT PROX ECA EDV: 10.1 CM/SEC
BH CV XLRA MEAS LEFT PROX ECA PSV: 145.4 CM/SEC
BH CV XLRA MEAS LEFT PROX ICA EDV: 23.61 CM/SEC
BH CV XLRA MEAS LEFT PROX ICA PSV: 71.4 CM/SEC
BH CV XLRA MEAS LEFT PROX SCLA EDV: 2.34 CM/SEC
BH CV XLRA MEAS LEFT PROX SCLA PSV: 228 CM/SEC
BH CV XLRA MEAS LEFT VERTEBRAL A EDV: 23.84 CM/SEC
BH CV XLRA MEAS LEFT VERTEBRAL A PSV: 111.9 CM/SEC
BH CV XLRA MEAS RIGHT DIST CCA EDV: 12.6 CM/SEC
BH CV XLRA MEAS RIGHT DIST CCA PSV: 56.32 CM/SEC
BH CV XLRA MEAS RIGHT DIST ICA EDV: 21.6 CM/SEC
BH CV XLRA MEAS RIGHT DIST ICA PSV: 62.3 CM/SEC
BH CV XLRA MEAS RIGHT MID CCA EDV: 10 CM/SEC
BH CV XLRA MEAS RIGHT MID CCA PSV: 69.4 CM/SEC
BH CV XLRA MEAS RIGHT MID ICA EDV: 30.6 CM/SEC
BH CV XLRA MEAS RIGHT MID ICA PSV: 118.23 CM/SEC
BH CV XLRA MEAS RIGHT PROX CCA EDV: 14.7 CM/SEC
BH CV XLRA MEAS RIGHT PROX CCA PSV: 92.8 CM/SEC
BH CV XLRA MEAS RIGHT PROX ECA EDV: 14.4 CM/SEC
BH CV XLRA MEAS RIGHT PROX ECA PSV: 138.16 CM/SEC
BH CV XLRA MEAS RIGHT PROX ICA EDV: 31.5 CM/SEC
BH CV XLRA MEAS RIGHT PROX ICA PSV: 115.9 CM/SEC
BH CV XLRA MEAS RIGHT PROX SCLA PSV: 108.6 CM/SEC
BH CV XLRA MEAS RIGHT VERTEBRAL A EDV: 7.22 CM/SEC
BH CV XLRA MEAS RIGHT VERTEBRAL A PSV: 21.27 CM/SEC
LEFT ARM BP: NORMAL MMHG
LEFT ATRIUM VOLUME INDEX: 35.3 ML/M^2
LEFT ATRIUM VOLUME: 71 ML
MAXIMAL PREDICTED HEART RATE: 141 BPM
RIGHT ARM BP: NORMAL MMHG
SINUS: 4.9 CM
STRESS TARGET HR: 120 BPM

## 2021-04-21 PROCEDURE — 99214 OFFICE O/P EST MOD 30 MIN: CPT | Performed by: INTERNAL MEDICINE

## 2021-04-21 RX ORDER — BISOPROLOL FUMARATE 5 MG/1
5 TABLET, FILM COATED ORAL DAILY
Qty: 30 TABLET | Refills: 5 | Status: SHIPPED | OUTPATIENT
Start: 2021-04-21 | End: 2021-09-27 | Stop reason: SDUPTHER

## 2021-04-21 NOTE — PROGRESS NOTES
[facility] at [facility] [place] [place]  Office visit  Harpreet Ceballos  1940    There is no work phone number on file.    VISIT DATE:  4/21/2021      PCP: Provider, No Known  [facility] [id number]    CC:  Chief Complaint   Patient presents with   • Coronary Artery Disease     f/u       Previous cardiac studies and procedures:  August 2018  Echo  · Calculated EF = 50%. Estimated EF appears to be in the range of 51 - 55%.  · Left ventricular wall thickness is consistent with mild concentric hypertrophy.  · Mild to moderate aortic valve regurgitation is present.  · Mild dilation of the ascending aorta is present. 4 cm.  Myocardial perfusion imaging  · REST EF = 38% STRESS EF = 34%.  · Left ventricular ejection fraction is moderately reduced and severely dilated.  · Moderate size region of moderate ischemia involving the anterior wall and apex.  · Large region of ischemia involving the entire lateral wall, significantly decreased at rest well potentially consistent for underlying infarction versus hibernating myocardium  · Impressions are consistent with a high risk study. Concerning for multivessel disease.  Cardiac catheterization  · LM: Dilated as a continuation of dilated sinuses of Valsalva.    · LAD: 70% ostial stenosis, 60% proximal stenosis and a long 80% midsegment stenosis.  The distal vessel has diffuse plaque.  A medium size first diagonal has 90% stenosis.  · LCX: Nondominant vessel with 70% proximal stenosis and total mid segment occlusion.  A major obtuse marginal branch is also totally occluded in its midsegment.  The lateral filling of the obtuse marginal and the distal circumflex is seen via left-sided collaterals.  · RCA: Dominant vessel with a 90% distal stenosis.  · Three-vessel obstructive coronary disease.  CT angio chest  dilatation of the aortic root measuring up to 4.3 cm   CTA neck  Atherosclerotic involvement of the bilateral carotid bulbs far greater  on the right  with approximately 80% luminal narrowing by NASCET  Criteria.    September 2018   -coronary bypass grafting: LIMA to first diagonal and left anterior descending artery, vein graft to obtuse marginal one and obtuse marginal 2, vein graft to right posterior descending artery.    March 2019 echo  · Estimated EF appears to be in the range of 56 - 60%.  · Mild dilation of the sinuses of Valsalva, 4cm. Mild dilation of the ascending aorta, 4.3cm.  · Mild aortic valve regurgitation is present.  · Elevated left atrial pressure.    December 2019 carotid duplex  · Right internal carotid artery stenosis of 50-69%.  · Proximal left internal carotid artery plaque without significant stenosis.  · Left internal carotid artery stenosis of 0-49%.    ASSESSMENT:   Diagnosis Plan   1. Coronary artery disease involving native coronary artery of native heart without angina pectoris     2. Carotid stenosis, asymptomatic, bilateral     3. Essential hypertension     4. Mixed hyperlipidemia         PLAN:  Coronary artery disease: Stable and asymptomatic.  Continue aspirin and statin.    Hyperlipidemia: Currently with excellent control.  Goal LDL less than 70.  Awaiting retrial of statin therapy until medical therapy for cardiomyopathy has been optimized.    Carotid stenosis, bilateral: Right-sided TIA in December.   Stable on carotid duplex.  Continue aspirin.    Ascending aortic aneurysm: Goal blood pressure less than 130/80 mmHg.  Continue current medical therapy.  Adding beta-blockade.  Continue imaging surveillance on an annual basis.    Cardiomyopathy, ischemic: Interval drop in LV systolic function.  Liz scan myocardial perfusion imaging pending for ischemia evaluation.  Starting bisoprolol 5 mg p.o. daily.  He has historically been very sensitive to medications.  Will initiate one medication at a time and gradually titrate.    Subjective  Presented with left hand weakness to [facility] in December 2019.  CTA revealed a  "50% right internal carotid artery stenosis, occluded right vertebral artery, and mild to moderate iCAD.   Has been taking an aspirin a day.  Previously stopped taking Plavix because he felt like it was raising his diastolic blood pressure.  Home blood pressures running lower than 140/90 mmHg.  Mild shortness of breath in a class II pattern.  Denies PND orthopnea.  No chest discomfort.  He stopped taking his rosuvastatin because he felt it was making him feel sick.    PHYSICAL EXAMINATION:  Vitals:    04/21/21 0810   BP: 138/90   BP Location: Left arm   Patient Position: Sitting   Pulse: 71   SpO2: 99%   Weight: 85.2 kg (187 lb 12.8 oz)   Height: 170.2 cm (67\")     General Appearance:    Alert, cooperative, no distress, appears stated age   Head:    Normocephalic, without obvious abnormality, atraumatic   Eyes:    conjunctiva/corneas clear   Nose:   Nares normal, septum midline, mucosa normal, no drainage   Throat:   Lips, teeth and gums normal   Neck:   Supple, symmetrical, trachea midline, no carotid    bruit or JVD   Lungs:     Clear to auscultation bilaterally, respirations unlabored   Chest Wall:    No tenderness or deformity    Heart:   Regular rate and rhythm, no murmurs rubs gallops, normal S1-S2.  Bilateral carotid bruits.   Abdomen:     Soft, non-tender   Extremities:   Extremities normal, atraumatic, no cyanosis or edema   Pulses:   2+ and symmetric all extremities   Skin:   Skin color, texture, turgor normal, no rashes or lesions       Diagnostic Data:  Procedures  Lab Results   Component Value Date    TRIG 216 (H) 09/10/2020    HDL 43 09/10/2020     Lab Results   Component Value Date    GLUCOSE 93 09/10/2020    BUN 18 09/10/2020    CREATININE 1.09 09/10/2020     09/10/2020    K 4.2 09/10/2020     09/10/2020    CO2 20.6 (L) 09/10/2020     No results found for: HGBA1C  Lab Results   Component Value Date    WBC 6.16 08/26/2018    HGB 15.8 08/26/2018    HCT 46.9 08/26/2018     08/26/2018 "       Allergies  No Known Allergies    Current Medications    Current Outpatient Medications:   •  aspirin 81 MG EC tablet, Take 1 tablet by mouth Daily., Disp: , Rfl:   •  TAMSULOSIN HCL PO, , Disp: , Rfl:           ROS  Review of Systems   Cardiovascular: Positive for irregular heartbeat. Negative for chest pain, dyspnea on exertion, leg swelling, near-syncope and palpitations.   Respiratory: Negative for cough, shortness of breath and sputum production.        SOCIAL HX  Social History     Socioeconomic History   • Marital status:      Spouse name: Not on file   • Number of children: Not on file   • Years of education: Not on file   • Highest education level: Not on file   Tobacco Use   • Smoking status: Never Smoker   • Smokeless tobacco: Never Used   Substance and Sexual Activity   • Alcohol use: Yes     Alcohol/week: 1.0 standard drinks     Types: 1 Cans of beer per week     Comment: A BEER A DAY   • Drug use: No   • Sexual activity: Defer       FAMILY HX  Family History   Problem Relation Age of Onset   • No Known Problems Mother    • No Known Problems Father              Oscar Anderson III, MD, FACC

## 2021-05-28 ENCOUNTER — HOSPITAL ENCOUNTER (OUTPATIENT)
Dept: CARDIOLOGY | Facility: HOSPITAL | Age: 81
Discharge: HOME | End: 2021-05-28
Admitting: INTERNAL MEDICINE

## 2021-05-28 VITALS
HEART RATE: 70 BPM | DIASTOLIC BLOOD PRESSURE: 86 MMHG | SYSTOLIC BLOOD PRESSURE: 150 MMHG | HEIGHT: 67 IN | WEIGHT: 187 LBS | BODY MASS INDEX: 29.35 KG/M2

## 2021-05-28 DIAGNOSIS — R06.09 DYSPNEA ON EXERTION: ICD-10-CM

## 2021-05-28 LAB
BH CV REST NUCLEAR ISOTOPE DOSE: 9.8 MCI
BH CV STRESS BP STAGE 1: NORMAL
BH CV STRESS BP STAGE 3: NORMAL
BH CV STRESS COMMENTS STAGE 1: NORMAL
BH CV STRESS DOSE REGADENOSON STAGE 1: 0.4
BH CV STRESS DURATION MIN STAGE 1: 1
BH CV STRESS DURATION MIN STAGE 2: 1
BH CV STRESS DURATION MIN STAGE 3: 1
BH CV STRESS DURATION MIN STAGE 4: 1
BH CV STRESS DURATION SEC STAGE 2: 0
BH CV STRESS HR STAGE 1: 77
BH CV STRESS HR STAGE 2: 99
BH CV STRESS HR STAGE 3: 88
BH CV STRESS HR STAGE 4: 86
BH CV STRESS NUCLEAR ISOTOPE DOSE: 33 MCI
BH CV STRESS O2 STAGE 1: 93
BH CV STRESS O2 STAGE 2: 96
BH CV STRESS O2 STAGE 3: 96
BH CV STRESS O2 STAGE 4: 96
BH CV STRESS PROTOCOL 1: NORMAL
BH CV STRESS RECOVERY BP: NORMAL MMHG
BH CV STRESS RECOVERY HR: 84 BPM
BH CV STRESS RECOVERY O2: 95 %
BH CV STRESS STAGE 1: 1
BH CV STRESS STAGE 2: 2
BH CV STRESS STAGE 3: 3
BH CV STRESS STAGE 4: 4
LV EF NUC BP: 36 %
MAXIMAL PREDICTED HEART RATE: 140 BPM
PERCENT MAX PREDICTED HR: 72.86 %
STRESS BASELINE BP: NORMAL MMHG
STRESS BASELINE HR: 68 BPM
STRESS O2 SAT REST: 95 %
STRESS PERCENT HR: 86 %
STRESS POST ESTIMATED WORKLOAD: 1 METS
STRESS POST EXERCISE DUR MIN: 4 MIN
STRESS POST EXERCISE DUR SEC: 0 SEC
STRESS POST O2 SAT PEAK: 96 %
STRESS POST PEAK BP: NORMAL MMHG
STRESS POST PEAK HR: 102 BPM
STRESS TARGET HR: 119 BPM

## 2021-05-28 PROCEDURE — 78452 HT MUSCLE IMAGE SPECT MULT: CPT | Performed by: INTERNAL MEDICINE

## 2021-05-28 PROCEDURE — A9500 TC99M SESTAMIBI: HCPCS | Performed by: INTERNAL MEDICINE

## 2021-05-28 PROCEDURE — 93018 CV STRESS TEST I&R ONLY: CPT | Performed by: INTERNAL MEDICINE

## 2021-05-28 PROCEDURE — 93017 CV STRESS TEST TRACING ONLY: CPT

## 2021-05-28 PROCEDURE — 25010000002 REGADENOSON 0.4 MG/5ML SOLUTION: Performed by: INTERNAL MEDICINE

## 2021-05-28 PROCEDURE — 78452 HT MUSCLE IMAGE SPECT MULT: CPT

## 2021-05-28 PROCEDURE — 0 TECHNETIUM SESTAMIBI: Performed by: INTERNAL MEDICINE

## 2021-05-28 RX ADMIN — TECHNETIUM TC 99M SESTAMIBI 1 DOSE: 1 INJECTION INTRAVENOUS at 10:05

## 2021-05-28 RX ADMIN — REGADENOSON 0.4 MG: 0.08 INJECTION, SOLUTION INTRAVENOUS at 10:00

## 2021-05-28 RX ADMIN — TECHNETIUM TC 99M SESTAMIBI 1 DOSE: 1 INJECTION INTRAVENOUS at 08:10

## 2021-06-09 ENCOUNTER — OFFICE VISIT (OUTPATIENT)
Dept: CARDIOLOGY | Facility: CLINIC | Age: 81
End: 2021-06-09

## 2021-06-09 VITALS
HEIGHT: 67 IN | OXYGEN SATURATION: 98 % | TEMPERATURE: 97.5 F | SYSTOLIC BLOOD PRESSURE: 160 MMHG | WEIGHT: 189 LBS | DIASTOLIC BLOOD PRESSURE: 78 MMHG | BODY MASS INDEX: 29.66 KG/M2 | HEART RATE: 59 BPM

## 2021-06-09 DIAGNOSIS — I10 ESSENTIAL HYPERTENSION: ICD-10-CM

## 2021-06-09 DIAGNOSIS — I25.10 CORONARY ARTERY DISEASE INVOLVING NATIVE CORONARY ARTERY OF NATIVE HEART WITHOUT ANGINA PECTORIS: Primary | ICD-10-CM

## 2021-06-09 DIAGNOSIS — I65.23 CAROTID STENOSIS, ASYMPTOMATIC, BILATERAL: ICD-10-CM

## 2021-06-09 DIAGNOSIS — E78.2 MIXED HYPERLIPIDEMIA: ICD-10-CM

## 2021-06-09 PROCEDURE — 99214 OFFICE O/P EST MOD 30 MIN: CPT | Performed by: INTERNAL MEDICINE

## 2021-06-09 RX ORDER — SACUBITRIL AND VALSARTAN 24; 26 MG/1; MG/1
1 TABLET, FILM COATED ORAL 2 TIMES DAILY
Qty: 60 TABLET | Refills: 5 | Status: SHIPPED | OUTPATIENT
Start: 2021-06-09 | End: 2021-09-27

## 2021-09-21 ENCOUNTER — LAB (OUTPATIENT)
Dept: LAB | Facility: HOSPITAL | Age: 81
End: 2021-09-21

## 2021-09-21 LAB
ALBUMIN SERPL-MCNC: 4.2 G/DL (ref 3.5–5.2)
ALBUMIN/GLOB SERPL: 1.3 G/DL
ALP SERPL-CCNC: 89 U/L (ref 39–117)
ALT SERPL W P-5'-P-CCNC: 16 U/L (ref 1–41)
ANION GAP SERPL CALCULATED.3IONS-SCNC: 9.8 MMOL/L (ref 5–15)
AST SERPL-CCNC: 20 U/L (ref 1–40)
BILIRUB SERPL-MCNC: 0.6 MG/DL (ref 0–1.2)
BUN SERPL-MCNC: 19 MG/DL (ref 8–23)
BUN/CREAT SERPL: 15.2 (ref 7–25)
CALCIUM SPEC-SCNC: 9.3 MG/DL (ref 8.6–10.5)
CHLORIDE SERPL-SCNC: 105 MMOL/L (ref 98–107)
CHOLEST SERPL-MCNC: 179 MG/DL (ref 0–200)
CO2 SERPL-SCNC: 23.2 MMOL/L (ref 22–29)
CREAT SERPL-MCNC: 1.25 MG/DL (ref 0.76–1.27)
GFR SERPL CREATININE-BSD FRML MDRD: 56 ML/MIN/1.73
GLOBULIN UR ELPH-MCNC: 3.2 GM/DL
GLUCOSE SERPL-MCNC: 88 MG/DL (ref 65–99)
HDLC SERPL-MCNC: 40 MG/DL (ref 40–60)
LDLC SERPL CALC-MCNC: 103 MG/DL (ref 0–100)
LDLC/HDLC SERPL: 2.44 {RATIO}
POTASSIUM SERPL-SCNC: 4.7 MMOL/L (ref 3.5–5.2)
PROT SERPL-MCNC: 7.4 G/DL (ref 6–8.5)
SODIUM SERPL-SCNC: 138 MMOL/L (ref 136–145)
TRIGL SERPL-MCNC: 208 MG/DL (ref 0–150)
VLDLC SERPL-MCNC: 36 MG/DL (ref 5–40)

## 2021-09-21 PROCEDURE — 36415 COLL VENOUS BLD VENIPUNCTURE: CPT | Performed by: INTERNAL MEDICINE

## 2021-09-21 PROCEDURE — 80053 COMPREHEN METABOLIC PANEL: CPT | Performed by: INTERNAL MEDICINE

## 2021-09-21 PROCEDURE — 80061 LIPID PANEL: CPT | Performed by: INTERNAL MEDICINE

## 2021-09-27 ENCOUNTER — OFFICE VISIT (OUTPATIENT)
Dept: CARDIOLOGY | Facility: CLINIC | Age: 81
End: 2021-09-27

## 2021-09-27 VITALS
HEART RATE: 72 BPM | BODY MASS INDEX: 30.35 KG/M2 | OXYGEN SATURATION: 95 % | DIASTOLIC BLOOD PRESSURE: 76 MMHG | WEIGHT: 193.4 LBS | SYSTOLIC BLOOD PRESSURE: 124 MMHG | HEIGHT: 67 IN

## 2021-09-27 DIAGNOSIS — I25.10 CORONARY ARTERY DISEASE INVOLVING NATIVE CORONARY ARTERY OF NATIVE HEART WITHOUT ANGINA PECTORIS: Primary | ICD-10-CM

## 2021-09-27 DIAGNOSIS — E78.2 MIXED HYPERLIPIDEMIA: ICD-10-CM

## 2021-09-27 DIAGNOSIS — I65.23 CAROTID STENOSIS, ASYMPTOMATIC, BILATERAL: ICD-10-CM

## 2021-09-27 DIAGNOSIS — I10 ESSENTIAL HYPERTENSION: ICD-10-CM

## 2021-09-27 PROCEDURE — 99214 OFFICE O/P EST MOD 30 MIN: CPT | Performed by: INTERNAL MEDICINE

## 2021-09-27 RX ORDER — ROSUVASTATIN CALCIUM 20 MG/1
20 TABLET, COATED ORAL DAILY
Qty: 30 TABLET | Refills: 11 | Status: SHIPPED | OUTPATIENT
Start: 2021-09-27

## 2021-09-27 RX ORDER — BISOPROLOL FUMARATE 5 MG/1
5 TABLET, FILM COATED ORAL DAILY
Qty: 30 TABLET | Refills: 5 | Status: SHIPPED | OUTPATIENT
Start: 2021-09-27

## 2021-09-27 NOTE — PROGRESS NOTES
[facility] at [facility]  Office visit  Harpreet Ceballos  1940    There is no work phone number on file.    VISIT DATE:  9/27/2021      PCP: Provider, No Known  [facility] [id number]    CC:  Chief Complaint   Patient presents with   • Coronary artery disease involving native coronary artery of        Previous cardiac studies and procedures:  August 2018  Echo  · Calculated EF = 50%. Estimated EF appears to be in the range of 51 - 55%.  · Left ventricular wall thickness is consistent with mild concentric hypertrophy.  · Mild to moderate aortic valve regurgitation is present.  · Mild dilation of the ascending aorta is present. 4 cm.  Myocardial perfusion imaging  · REST EF = 38% STRESS EF = 34%.  · Left ventricular ejection fraction is moderately reduced and severely dilated.  · Moderate size region of moderate ischemia involving the anterior wall and apex.  · Large region of ischemia involving the entire lateral wall, significantly decreased at rest well potentially consistent for underlying infarction versus hibernating myocardium  · Impressions are consistent with a high risk study. Concerning for multivessel disease.  Cardiac catheterization  · LM: Dilated as a continuation of dilated sinuses of Valsalva.    · LAD: 70% ostial stenosis, 60% proximal stenosis and a long 80% midsegment stenosis.  The distal vessel has diffuse plaque.  A medium size first diagonal has 90% stenosis.  · LCX: Nondominant vessel with 70% proximal stenosis and total mid segment occlusion.  A major obtuse marginal branch is also totally occluded in its midsegment.  The lateral filling of the obtuse marginal and the distal circumflex is seen via left-sided collaterals.  · RCA: Dominant vessel with a 90% distal stenosis.  · Three-vessel obstructive coronary disease.  CT angio chest  dilatation of the aortic root measuring up to 4.3 cm   CTA neck  Atherosclerotic involvement of the bilateral carotid  bulbs far greater  on the right with approximately 80% luminal narrowing by NASCET  Criteria.    September 2018   -coronary bypass grafting: LIMA to first diagonal and left anterior descending artery, vein graft to obtuse marginal one and obtuse marginal 2, vein graft to right posterior descending artery.    March 2019 echo  · Estimated EF appears to be in the range of 56 - 60%.  · Mild dilation of the sinuses of Valsalva, 4cm. Mild dilation of the ascending aorta, 4.3cm.  · Mild aortic valve regurgitation is present.  · Elevated left atrial pressure.    December 2019 carotid duplex  · Right internal carotid artery stenosis of 50-69%.  · Proximal left internal carotid artery plaque without significant stenosis.  · Left internal carotid artery stenosis of 0-49%.    April 2021  Carotid duplex imaging  · Proximal right internal carotid artery plaque without significant stenosis.  · Right internal carotid artery stenosis of 0-49%.  · Proximal left internal carotid artery plaque without significant stenosis.  · Left internal carotid artery stenosis of 0-49%.  Transthoracic echocardiogram  · Calculated left ventricular EF = 33% Estimated left ventricular EF was in agreement with the calculated left ventricular EF. Left ventricular systolic function is moderately decreased.  · Left ventricular diastolic function is consistent with (grade I) impaired relaxation.  · Left ventricular wall thickness is consistent with moderate concentric hypertrophy.  · Left atrial volume is mildly increased.  · Moderate dilation of the sinuses of Valsalva is present. Mild dilation of the ascending aorta is present.  · Moderate dilation of the sinuses of Valsalva is present. Sinus of Valsalva = 4.9 cm  · Mild dilation of the ascending aorta is present. Ascending aorta = 4.0 cm  · Mild aortic valve regurgitation    May 2021 Liz scan myocardial perfusion imaging  · Attenuation corrected images show mild-moderate fixed defects in the mid-apical  anteroseptal wall, mid-apical lateral wall, and apex. No reversible ischemic defects (SDS = 0)  · The LV appears dilated at both rest and stress. Normal TID ratio 1.02  · Left ventricular ejection fraction is moderately reduced. (Calculated EF = 36%).  · Impression: Intermediate risk study secondary to reduced LV function EF 36%. Fixed anteroseptal, lateral, apical defects. No significant ischemia noted.    ASSESSMENT:   Diagnosis Plan   1. Coronary artery disease involving native coronary artery of native heart without angina pectoris     2. Essential hypertension     3. Mixed hyperlipidemia     4. Carotid stenosis, asymptomatic, bilateral         PLAN:  Coronary artery disease: Stable and asymptomatic.  Continue aspirin.    Hyperlipidemia: Currently with excellent control.  Goal LDL less than 70.  Starting rosuvastatin 20 mg p.o. daily.  Previously sensitive to changes in his medical therapy.    Carotid stenosis, bilateral: Right-sided TIA in December.   Stable on carotid duplex.  Continue aspirin.    Ascending aortic aneurysm: Goal blood pressure less than 130/80 mmHg.  Continue current medical therapy.  Restarting beta-blockade.  Continue imaging surveillance on an annual basis.    Cardiomyopathy, ischemic: Moderately decreased EF.  Gradually titrating medical therapy.  Restarting bisoprolol, will gradually titrate medical therapy as blood pressure tolerates.    Subjective  Presented with left hand weakness to [facility] in December 2019.  CTA revealed a 50% right internal carotid artery stenosis, occluded right vertebral artery, and mild to moderate iCAD.   Has been taking an aspirin a day.  Previously stopped taking Plavix because he felt like it was raising his diastolic blood pressure.  Home blood pressures running lower than 120/80 mmHg.  Off Entresto because it was cost prohibitive.  He is currently not taking bisoprolol because he thought it was discontinued previously.    PHYSICAL  "EXAMINATION:  Vitals:    09/27/21 1454   BP: 124/76   BP Location: Left arm   Patient Position: Sitting   Pulse: 72   SpO2: 95%   Weight: 87.7 kg (193 lb 6.4 oz)   Height: 170.2 cm (67\")     General Appearance:    Alert, cooperative, no distress, appears stated age   Head:    Normocephalic, without obvious abnormality, atraumatic   Eyes:    conjunctiva/corneas clear   Nose:   Nares normal, septum midline, mucosa normal, no drainage   Throat:   Lips, teeth and gums normal   Neck:   Supple, symmetrical, trachea midline, no carotid    bruit or JVD   Lungs:     Clear to auscultation bilaterally, respirations unlabored   Chest Wall:    No tenderness or deformity    Heart:   Regular rate and rhythm, no murmurs rubs gallops, normal S1-S2.  Bilateral carotid bruits.   Abdomen:     Soft, non-tender   Extremities:   Extremities normal, atraumatic, no cyanosis or edema   Pulses:   2+ and symmetric all extremities   Skin:   Skin color, texture, turgor normal, no rashes or lesions       Diagnostic Data:  Procedures  Lab Results   Component Value Date    TRIG 208 (H) 09/21/2021    HDL 40 09/21/2021     Lab Results   Component Value Date    GLUCOSE 88 09/21/2021    BUN 19 09/21/2021    CREATININE 1.25 09/21/2021     09/21/2021    K 4.7 09/21/2021     09/21/2021    CO2 23.2 09/21/2021     No results found for: HGBA1C  Lab Results   Component Value Date    WBC 6.16 08/26/2018    HGB 15.8 08/26/2018    HCT 46.9 08/26/2018     08/26/2018       Allergies  No Known Allergies    Current Medications    Current Outpatient Medications:   •  aspirin 81 MG EC tablet, Take 1 tablet by mouth Daily., Disp: , Rfl:   •  TAMSULOSIN HCL PO, 2 capsules Daily., Disp: , Rfl:   •  bisoprolol (ZEBeta) 5 MG tablet, Take 1 tablet by mouth Daily., Disp: 30 tablet, Rfl: 5  •  sacubitril-valsartan (Entresto) 24-26 MG tablet, Take 1 tablet by mouth 2 (Two) Times a Day., Disp: 60 tablet, Rfl: 5          ROS  Review of Systems "   Cardiovascular: Positive for irregular heartbeat. Negative for chest pain, dyspnea on exertion, leg swelling, near-syncope and palpitations.   Respiratory: Negative for cough, shortness of breath and sputum production.        SOCIAL HX  Social History     Socioeconomic History   • Marital status:      Spouse name: Not on file   • Number of children: Not on file   • Years of education: Not on file   • Highest education level: Not on file   Tobacco Use   • Smoking status: Never Smoker   • Smokeless tobacco: Never Used   Substance and Sexual Activity   • Alcohol use: Yes     Alcohol/week: 1.0 standard drinks     Types: 1 Cans of beer per week     Comment: A BEER A DAY   • Drug use: No   • Sexual activity: Yes     Partners: Female     Birth control/protection: None     Comment: Age       FAMILY HX  Family History   Problem Relation Age of Onset   • No Known Problems Mother    • No Known Problems Father              Oscar Anderson III, MD, FACC

## 2024-07-08 ENCOUNTER — APPOINTMENT (OUTPATIENT)
Dept: CARDIOLOGY | Facility: HOSPITAL | Age: 84
End: 2024-07-08
Payer: MEDICARE

## 2024-07-08 ENCOUNTER — APPOINTMENT (OUTPATIENT)
Dept: GENERAL RADIOLOGY | Facility: HOSPITAL | Age: 84
End: 2024-07-08
Payer: MEDICARE

## 2024-07-08 ENCOUNTER — HOSPITAL ENCOUNTER (INPATIENT)
Facility: HOSPITAL | Age: 84
LOS: 3 days | Discharge: HOME | End: 2024-07-11
Attending: EMERGENCY MEDICINE | Admitting: STUDENT IN AN ORGANIZED HEALTH CARE EDUCATION/TRAINING PROGRAM
Payer: MEDICARE

## 2024-07-08 DIAGNOSIS — R00.1 SYMPTOMATIC BRADYCARDIA: ICD-10-CM

## 2024-07-08 DIAGNOSIS — I45.9 HEART BLOCK: Primary | ICD-10-CM

## 2024-07-08 DIAGNOSIS — T82.110A PACEMAKER LEAD MALFUNCTION, INITIAL ENCOUNTER: ICD-10-CM

## 2024-07-08 DIAGNOSIS — L98.9 SKIN LESIONS: ICD-10-CM

## 2024-07-08 PROBLEM — I44.2 COMPLETE HEART BLOCK: Status: ACTIVE | Noted: 2024-07-08

## 2024-07-08 LAB
ALBUMIN SERPL-MCNC: 4.2 G/DL (ref 3.5–5.2)
ALBUMIN/GLOB SERPL: 1.3 G/DL
ALP SERPL-CCNC: 78 U/L (ref 39–117)
ALT SERPL W P-5'-P-CCNC: 11 U/L (ref 1–41)
ANION GAP SERPL CALCULATED.3IONS-SCNC: 14 MMOL/L (ref 5–15)
AST SERPL-CCNC: 15 U/L (ref 1–40)
BASOPHILS # BLD AUTO: 0.05 10*3/MM3 (ref 0–0.2)
BASOPHILS NFR BLD AUTO: 0.6 % (ref 0–1.5)
BILIRUB SERPL-MCNC: 1.6 MG/DL (ref 0–1.2)
BUN SERPL-MCNC: 24 MG/DL (ref 8–23)
BUN/CREAT SERPL: 18.3 (ref 7–25)
CALCIUM SPEC-SCNC: 9.1 MG/DL (ref 8.6–10.5)
CHLORIDE SERPL-SCNC: 108 MMOL/L (ref 98–107)
CO2 SERPL-SCNC: 20 MMOL/L (ref 22–29)
CREAT SERPL-MCNC: 1.31 MG/DL (ref 0.76–1.27)
DEPRECATED RDW RBC AUTO: 53.1 FL (ref 37–54)
EGFRCR SERPLBLD CKD-EPI 2021: 54 ML/MIN/1.73
EOSINOPHIL # BLD AUTO: 0.23 10*3/MM3 (ref 0–0.4)
EOSINOPHIL NFR BLD AUTO: 2.9 % (ref 0.3–6.2)
ERYTHROCYTE [DISTWIDTH] IN BLOOD BY AUTOMATED COUNT: 14.9 % (ref 12.3–15.4)
GEN 5 2HR TROPONIN T REFLEX: 56 NG/L
GLOBULIN UR ELPH-MCNC: 3.3 GM/DL
GLUCOSE SERPL-MCNC: 122 MG/DL (ref 65–99)
HCT VFR BLD AUTO: 50.9 % (ref 37.5–51)
HGB BLD-MCNC: 16.7 G/DL (ref 13–17.7)
HOLD SPECIMEN: NORMAL
IMM GRANULOCYTES # BLD AUTO: 0.02 10*3/MM3 (ref 0–0.05)
IMM GRANULOCYTES NFR BLD AUTO: 0.2 % (ref 0–0.5)
LYMPHOCYTES # BLD AUTO: 2.06 10*3/MM3 (ref 0.7–3.1)
LYMPHOCYTES NFR BLD AUTO: 25.7 % (ref 19.6–45.3)
MCH RBC QN AUTO: 31.6 PG (ref 26.6–33)
MCHC RBC AUTO-ENTMCNC: 32.8 G/DL (ref 31.5–35.7)
MCV RBC AUTO: 96.2 FL (ref 79–97)
MONOCYTES # BLD AUTO: 0.55 10*3/MM3 (ref 0.1–0.9)
MONOCYTES NFR BLD AUTO: 6.8 % (ref 5–12)
NEUTROPHILS NFR BLD AUTO: 5.12 10*3/MM3 (ref 1.7–7)
NEUTROPHILS NFR BLD AUTO: 63.8 % (ref 42.7–76)
NRBC BLD AUTO-RTO: 0 /100 WBC (ref 0–0.2)
NT-PROBNP SERPL-MCNC: 8825 PG/ML (ref 0–1800)
PLATELET # BLD AUTO: 232 10*3/MM3 (ref 140–450)
PMV BLD AUTO: 10.5 FL (ref 6–12)
POTASSIUM SERPL-SCNC: 4.2 MMOL/L (ref 3.5–5.2)
PROT SERPL-MCNC: 7.5 G/DL (ref 6–8.5)
QT INTERVAL: 534 MS
QTC INTERVAL: 429 MS
RBC # BLD AUTO: 5.29 10*6/MM3 (ref 4.14–5.8)
SODIUM SERPL-SCNC: 142 MMOL/L (ref 136–145)
TROPONIN T DELTA: -12 NG/L
TROPONIN T SERPL HS-MCNC: 68 NG/L
TSH SERPL DL<=0.05 MIU/L-ACNC: 1.71 UIU/ML (ref 0.27–4.2)
WBC NRBC COR # BLD AUTO: 8.03 10*3/MM3 (ref 3.4–10.8)
WHOLE BLOOD HOLD COAG: NORMAL
WHOLE BLOOD HOLD SPECIMEN: NORMAL

## 2024-07-08 PROCEDURE — 36415 COLL VENOUS BLD VENIPUNCTURE: CPT

## 2024-07-08 PROCEDURE — 71045 X-RAY EXAM CHEST 1 VIEW: CPT

## 2024-07-08 PROCEDURE — 93306 TTE W/DOPPLER COMPLETE: CPT

## 2024-07-08 PROCEDURE — 85025 COMPLETE CBC W/AUTO DIFF WBC: CPT

## 2024-07-08 PROCEDURE — 25010000002 SULFUR HEXAFLUORIDE MICROSPH 60.7-25 MG RECONSTITUTED SUSPENSION

## 2024-07-08 PROCEDURE — 84484 ASSAY OF TROPONIN QUANT: CPT

## 2024-07-08 PROCEDURE — 93306 TTE W/DOPPLER COMPLETE: CPT | Performed by: INTERNAL MEDICINE

## 2024-07-08 PROCEDURE — 83880 ASSAY OF NATRIURETIC PEPTIDE: CPT

## 2024-07-08 PROCEDURE — 93005 ELECTROCARDIOGRAM TRACING: CPT

## 2024-07-08 PROCEDURE — 84443 ASSAY THYROID STIM HORMONE: CPT | Performed by: EMERGENCY MEDICINE

## 2024-07-08 PROCEDURE — 99222 1ST HOSP IP/OBS MODERATE 55: CPT | Performed by: STUDENT IN AN ORGANIZED HEALTH CARE EDUCATION/TRAINING PROGRAM

## 2024-07-08 PROCEDURE — 99285 EMERGENCY DEPT VISIT HI MDM: CPT

## 2024-07-08 PROCEDURE — 80053 COMPREHEN METABOLIC PANEL: CPT

## 2024-07-08 RX ORDER — TAMSULOSIN HYDROCHLORIDE 0.4 MG/1
0.8 CAPSULE ORAL NIGHTLY
Status: DISCONTINUED | OUTPATIENT
Start: 2024-07-08 | End: 2024-07-11 | Stop reason: HOSPADM

## 2024-07-08 RX ORDER — SODIUM CHLORIDE 0.9 % (FLUSH) 0.9 %
10 SYRINGE (ML) INJECTION AS NEEDED
Status: DISCONTINUED | OUTPATIENT
Start: 2024-07-08 | End: 2024-07-08

## 2024-07-08 RX ADMIN — SULFUR HEXAFLUORIDE 2 ML: KIT at 16:15

## 2024-07-08 RX ADMIN — TAMSULOSIN HYDROCHLORIDE 0.8 MG: 0.4 CAPSULE ORAL at 21:14

## 2024-07-08 NOTE — PLAN OF CARE
Goal Outcome Evaluation:  Plan of Care Reviewed With: patient        Progress: no change    A/Ox4, RA. NPO. Refusal of blood consent signed and in chart. Pt remains in ventricular rhythm. Some SOA. Provider notified for Troponin and HTN. Family at bedside. Will continue POC.     Problem: Adult Inpatient Plan of Care  Goal: Plan of Care Review  Outcome: Ongoing, Progressing  Flowsheets (Taken 7/8/2024 1533)  Progress: no change  Plan of Care Reviewed With: patient  Goal: Patient-Specific Goal (Individualized)  Outcome: Ongoing, Progressing  Goal: Absence of Hospital-Acquired Illness or Injury  Outcome: Ongoing, Progressing  Intervention: Identify and Manage Fall Risk  Recent Flowsheet Documentation  Taken 7/8/2024 1500 by Nicole Song RN  Safety Promotion/Fall Prevention:   clutter free environment maintained   assistive device/personal items within reach   activity supervised   fall prevention program maintained   room organization consistent   safety round/check completed   toileting scheduled   nonskid shoes/slippers when out of bed   lighting adjusted  Intervention: Prevent Skin Injury  Recent Flowsheet Documentation  Taken 7/8/2024 1500 by Nicole Song RN  Body Position: position changed independently  Intervention: Prevent and Manage VTE (Venous Thromboembolism) Risk  Recent Flowsheet Documentation  Taken 7/8/2024 1500 by Nicole Song RN  Activity Management: activity encouraged  VTE Prevention/Management:   bilateral   sequential compression devices off  Intervention: Prevent Infection  Recent Flowsheet Documentation  Taken 7/8/2024 1500 by Nicole Song RN  Infection Prevention:   rest/sleep promoted   single patient room provided   visitors restricted/screened   hand hygiene promoted  Goal: Optimal Comfort and Wellbeing  Outcome: Ongoing, Progressing  Intervention: Provide Person-Centered Care  Recent Flowsheet Documentation  Taken 7/8/2024 1500 by Nicole Song RN  Trust Relationship/Rapport:   care  explained   choices provided  Goal: Readiness for Transition of Care  Outcome: Ongoing, Progressing  Intervention: Mutually Develop Transition Plan  Recent Flowsheet Documentation  Taken 7/8/2024 1438 by Nicole Song RN  Transportation Anticipated: family or friend will provide  Patient/Family Anticipated Services at Transition: none  Patient/Family Anticipates Transition to: home with family  Taken 7/8/2024 1437 by Nicole Song, RN  Equipment Currently Used at Home: none     Problem: Hypertension Comorbidity  Goal: Blood Pressure in Desired Range  Outcome: Ongoing, Progressing  Intervention: Maintain Blood Pressure Management  Recent Flowsheet Documentation  Taken 7/8/2024 1500 by Nicole Song, RN  Medication Review/Management: medications reviewed

## 2024-07-08 NOTE — ED PROVIDER NOTES
[place]    EMERGENCY DEPARTMENT ENCOUNTER      Pt Name: Harpreet Ceballos  MRN: [id number]  YOB: 1940  Date of evaluation: 7/8/2024  Provider: Soren Nava DO    CHIEF COMPLAINT       Chief Complaint   Patient presents with    Chest Pain    Shortness of Breath     HPI  Stated Reason for Visit: PT TO ER FROM HOME FOR SOA AND CHEST PAIN. STATES HAS BEEN SOA X 1 YEAR BUT GOT WORSE OVER THE LAST COUPLE OF DAYS. STATES HAS PAIN IN BACK. NO COUGH. EXTENSIVE CARDIAC HX. DR ANDERSON-KETTY. History Obtained From: patient;family     HISTORY OF PRESENT ILLNESS  (Location/Symptom, Timing/Onset, Context/Setting, Quality, Duration, Modifying Factors, Severity.)   Harpreet Ceballos is a 84 y.o. male who presents to the emergency department with family with a concern for shortness of breath, palpitations, just overall not feeling well which she has been present for the last 1 year or so, has had worsening of symptoms over the last week, follows with Dr. Anderson with cardiology.  He is no longer on his Bystolic,, notes his heart rate has been low recently, but normally has heart rate in the 60s to low 70s at baseline.  No recent illness, no fever or chills.  He does have a growth on his back which she would like assessed as well as her concern for possible cancerous lesion.  He denies any active chest pain, does note generalized weakness, just overall not feeling well.  No other acute systemic complaints.      Nursing notes were reviewed.      PAST MEDICAL HISTORY     Past Medical History:   Diagnosis Date    Arrhythmia     CAD (coronary artery disease)     Carotid stenosis, asymptomatic, bilateral     Detached retina, left     Essential hypertension     Mixed hyperlipidemia     TIA (transient ischemic attack)          SURGICAL HISTORY       Past Surgical History:   Procedure Laterality Date    CARDIAC CATHETERIZATION N/A 8/27/2018    Procedure: Left Heart Cath;  Surgeon: López Thomas MD;  Location: [facility]  INVASIVE LOCATION;  Service: Cardiology    CORONARY ARTERY BYPASS GRAFT      SHOULDER ACROMIOCLAVICULAR JOINT REPAIR Left          CURRENT MEDICATIONS       Current Facility-Administered Medications:     sodium chloride 0.9 % flush 10 mL, 10 mL, Intravenous, PRN, Emergency, Triage Protocol, MD    Current Outpatient Medications:     aspirin 81 MG EC tablet, Take 1 tablet by mouth Daily., Disp: , Rfl:     bisoprolol (ZEBeta) 5 MG tablet, Take 1 tablet by mouth Daily., Disp: 30 tablet, Rfl: 5    rosuvastatin (CRESTOR) 20 MG tablet, Take 1 tablet by mouth Daily., Disp: 30 tablet, Rfl: 11    TAMSULOSIN HCL PO, 2 capsules Daily., Disp: , Rfl:     ALLERGIES     Patient has no known allergies.    FAMILY HISTORY       Family History   Problem Relation Age of Onset    No Known Problems Mother     No Known Problems Father           SOCIAL HISTORY       Social History     Socioeconomic History    Marital status:    Tobacco Use    Smoking status: Never    Smokeless tobacco: Never   Substance and Sexual Activity    Alcohol use: Yes     Alcohol/week: 1.0 standard drink of alcohol     Types: 1 Cans of beer per week     Comment: A BEER A DAY    Drug use: No    Sexual activity: Yes     Partners: Female     Birth control/protection: None     Comment: Age         PHYSICAL EXAM    (up to 7 for level 4, 8 or more for level 5)     Vitals:    07/08/24 1200 07/08/24 1201 07/08/24 1202 07/08/24 1230   BP: 164/72   167/73   BP Location:       Patient Position:       Pulse: (!) 38 (!) 37 (!) 38 (!) 37   Resp:       Temp:       TempSrc:       SpO2: 93% 94% 91% 95%   Weight:       Height:           Physical Exam  General : Patient is elderly, pleasant, awake, alert, oriented, in no acute distress, nontoxic appearing  HEENT: Pupils are equally round, EOMI, conjunctivae clear  Neck: Neck is supple, full range of motion, trachea midline  Cardiac: Heart bradycardic rate, junctional rhythm, no murmurs, rubs, or gallops  Lungs: Lungs are clear  to auscultation, there is no wheezing, rhonchi, or rales. There is no use of accessory muscles  Abdomen: Abdomen is soft, nontender, nondistended. There are no firm or pulsatile masses, no rebound rigidity or guarding  Musculoskeletal: Generalized muscle atrophy, no focal muscle deficits are appreciated  Neuro: Motor intact, sensory intact, level of consciousness is normal  Dermatology: There is an area of growth where a skin tag is present on the right scapular region approximately 1.5 inches in diameter, extending as a growth which appears to be cancerous in nature of the scapular region.      DIAGNOSTIC RESULTS     EKG:  All EKGs are interpreted by the Emergency Department Physician who either signs or Co-signs this chart in the absence of a cardiologist.    ECG 12 Lead Chest Pain   Final Result   Test Reason : Chest Pain   Blood Pressure :   */*   mmHG   Vent. Rate :  39 BPM     Atrial Rate :  39 BPM      P-R Int :   * ms          QRS Dur : 122 ms       QT Int : 534 ms       P-R-T Axes :   * -66 117 degrees      QTc Int : 429 ms      Idioventricular rhythm   Left axis deviation   Right bundle branch block   Voltage criteria for left ventricular hypertrophy   T wave abnormality, consider lateral ischemia   Abnormal ECG   When compared with ECG of 26-AUG-2018 12:50,   Idioventricular rhythm has replaced Sinus rhythm   Vent. rate has decreased BY  31 BPM   Confirmed by MK KIRAN MD ([id number]) on 7/8/2024 12:21:36 PM      Referred By: EDMD           Confirmed By: MK KIRAN MD          RADIOLOGY:     [x] Radiologist's Report Reviewed:  XR Chest 1 View   Final Result   Impression:   Enlarged cardiac silhouette, which obscures the left costophrenic angle. No acute findings otherwise.         Electronically Signed: Phoenix Waite MD     7/8/2024 12:22 PM EDT     Workstation ID: [id number]          I ordered and independently reviewed the above noted radiographic studies.      I viewed images of chest x-ray which  showed cardiomegaly per my independent interpretation.    See radiologist's dictation for official interpretation.      ED BEDSIDE ULTRASOUND:   Performed by ED Physician - none    LABS:    I have reviewed and interpreted all of the currently available lab results from this visit (if applicable):  Results for orders placed or performed during the hospital encounter of 07/08/24   Comprehensive Metabolic Panel    Specimen: Blood   Result Value Ref Range    Glucose 122 (H) 65 - 99 mg/dL    BUN 24 (H) 8 - 23 mg/dL    Creatinine 1.31 (H) 0.76 - 1.27 mg/dL    Sodium 142 136 - 145 mmol/L    Potassium 4.2 3.5 - 5.2 mmol/L    Chloride 108 (H) 98 - 107 mmol/L    CO2 20.0 (L) 22.0 - 29.0 mmol/L    Calcium 9.1 8.6 - 10.5 mg/dL    Total Protein 7.5 6.0 - 8.5 g/dL    Albumin 4.2 3.5 - 5.2 g/dL    ALT (SGPT) 11 1 - 41 U/L    AST (SGOT) 15 1 - 40 U/L    Alkaline Phosphatase 78 39 - 117 U/L    Total Bilirubin 1.6 (H) 0.0 - 1.2 mg/dL    Globulin 3.3 gm/dL    A/G Ratio 1.3 g/dL    BUN/Creatinine Ratio 18.3 7.0 - 25.0    Anion Gap 14.0 5.0 - 15.0 mmol/L    eGFR 54.0 (L) >60.0 mL/min/1.73   BNP    Specimen: Blood   Result Value Ref Range    proBNP 8,825.0 (H) 0.0 - 1,800.0 pg/mL   Single High Sensitivity Troponin T    Specimen: Blood   Result Value Ref Range    HS Troponin T 68 (C) <22 ng/L   CBC Auto Differential    Specimen: Blood   Result Value Ref Range    WBC 8.03 3.40 - 10.80 10*3/mm3    RBC 5.29 4.14 - 5.80 10*6/mm3    Hemoglobin 16.7 13.0 - 17.7 g/dL    Hematocrit 50.9 37.5 - 51.0 %    MCV 96.2 79.0 - 97.0 fL    MCH 31.6 26.6 - 33.0 pg    MCHC 32.8 31.5 - 35.7 g/dL    RDW 14.9 12.3 - 15.4 %    RDW-SD 53.1 37.0 - 54.0 fl    MPV 10.5 6.0 - 12.0 fL    Platelets 232 140 - 450 10*3/mm3    Neutrophil % 63.8 42.7 - 76.0 %    Lymphocyte % 25.7 19.6 - 45.3 %    Monocyte % 6.8 5.0 - 12.0 %    Eosinophil % 2.9 0.3 - 6.2 %    Basophil % 0.6 0.0 - 1.5 %    Immature Grans % 0.2 0.0 - 0.5 %    Neutrophils, Absolute 5.12 1.70 - 7.00 10*3/mm3     Lymphocytes, Absolute 2.06 0.70 - 3.10 10*3/mm3    Monocytes, Absolute 0.55 0.10 - 0.90 10*3/mm3    Eosinophils, Absolute 0.23 0.00 - 0.40 10*3/mm3    Basophils, Absolute 0.05 0.00 - 0.20 10*3/mm3    Immature Grans, Absolute 0.02 0.00 - 0.05 10*3/mm3    nRBC 0.0 0.0 - 0.2 /100 WBC   TSH    Specimen: Blood   Result Value Ref Range    TSH 1.710 0.270 - 4.200 uIU/mL   ECG 12 Lead Chest Pain   Result Value Ref Range    QT Interval 534 ms    QTC Interval 429 ms   Green Top (Gel)   Result Value Ref Range    Extra Tube Hold for add-ons.    Lavender Top   Result Value Ref Range    Extra Tube hold for add-on    Gold Top - SST   Result Value Ref Range    Extra Tube Hold for add-ons.    Gray Top   Result Value Ref Range    Extra Tube Hold for add-ons.    Light Blue Top   Result Value Ref Range    Extra Tube Hold for add-ons.         If labs were ordered, I independently reviewed the results and considered them in treating the patient.      EMERGENCY DEPARTMENT COURSE and DIFFERENTIAL DIAGNOSIS/MDM:   Vitals:  AS OF 13:28 EDT    BP - 167/73  HR - (!) 37  TEMP - 98.4 °F (36.9 °C) (Oral)  O2 SATS - 95%      Orders placed during this visit:  Orders Placed This Encounter   Procedures    XR Chest 1 View    [place] Draw    Comprehensive Metabolic Panel    BNP    Single High Sensitivity Troponin T    CBC Auto Differential    TSH    High Sensitivity Troponin T 2Hr    NPO Diet NPO Type: Strict NPO    Undress & Gown    Continuous Pulse Oximetry    Vital Signs    Oxygen Therapy- Nasal Cannula; Titrate 1-6 LPM Per SpO2; 90 - 95%    ECG 12 Lead Chest Pain    Adult Transthoracic Echo Complete W/ Cont if Necessary Per Protocol    Insert Peripheral IV    Tele Bed Request (SHARON / ALPHONSE / HAM ONLY)    CBC & Differential    Green Top (Gel)    Lavender Top    Gold Top - SST    Gray Top    Light Blue Top       All labs have been independently reviewed by me.  All radiology studies have been reviewed by me and the radiologist dictating the report.  All  EKG's have been independently viewed and interpreted by me.      Discussion below represents my analysis of pertinent findings related to patient's condition, differential diagnosis, treatment plan and final disposition.    Differential diagnosis:  The differential diagnosis associated with the patient's presentation includes: Symptomatic bradycardia, electrolyte normalities, congestive heart failure, renal insufficiency, atrial fibrillation with slow ventricular response    Additional sources  Discussed/ obtained information from independent historians:   [] Spouse  [] Parent  [x] Family member, daughter at bedside  [] Friend  [] EMS   [] Other:    External (non-ED) record review:   [] Inpatient record:   [] Office record:   [] Outpatient record:   [] Prior Outpatient labs:   [] Prior Outpatient radiology:   [] Primary Care record:   [] Outside ED record:   [] Other:     Patient's care impacted by:   [] Diabetes  [] Hypertension  [] CHF  [] Hyperlipidemia  [] Coronary Artery Disease   [] COPD   [] Cancer   [] Tobacco Abuse   [] Substance Abuse    [] Other:     Care significantly affected by Social Determinants of Health (housing and economic circumstances, unemployment)    [] Yes     [x] No   If yes, Patient's care significantly limited by Social Determinants of Health including:   [] Inadequate housing   [] Low income   [] Alcoholism and drug addiction in family   [] Problems related to primary support group   [] Unemployment   [] Problems related to employment   [] Other Social Determinants of Health:       MEDICATIONS ADMINISTERED IN ED:  Medications   sodium chloride 0.9 % flush 10 mL (has no administration in time range)              Summary pleasant 84-year-old male resents with weakness, found to be in a junctional idioventricular rhythm about 37 bpm with a stable blood pressure 153/80, neurologically is intact without chest pain.  Patient's normal resting heart rate is in the 60s to low 70s.  He had any  syncopal episodes, generalized weakness and shortness of breath or his main presenting symptoms.  We did proceed forward with IV, labs and imaging, potassium stable at 4.2, creatinine 1.31, BNP slightly elevated at 8800.  I did discuss the case with on-call for cardiology for their consultation, appreciate their input.  Patient may be candidate for cardiac pacemaker implantation.  Patient was assessed bedside by electrophysiologist Dr. Rdz, appreciate his input.  He does recommend this point proceeding forward with pacemaker implantation if the patient will consent for this as he appears to have heart block and is symptomatic.  Blood pressures remained stable.  Will also at some point need dermatology consultation for possible biopsy and skin cancer removal.        PROCEDURES:  Procedures    CRITICAL CARE TIME    Total Critical Care time was 35 minutes, excluding separately reportable procedures.  Patient in complete heart block, heart rate in the 30s requiring multiple assessments, reevaluations, discussions with multiple consultants and electrophysiology/cardiology. There was a high probability of clinically significant/life threatening deterioration in the patient's condition which required my urgent intervention.      FINAL IMPRESSION      1. Heart block    2. Symptomatic bradycardia    3. Skin lesions          DISPOSITION/PLAN     ED Disposition       ED Disposition   Decision to Admit    Condition   --    Comment   Level of Care: Telemetry [5]   Admitting Physician: HUBER RDZ [[id number]]                 Comment: Please note this report has been produced using speech recognition software.      Soren Nava DO  Attending Emergency Physician         Soren Nava,   07/08/24 3165

## 2024-07-08 NOTE — ED NOTES
Harpreet Ceballos    Nursing Report ED to Floor:  Mental status: ao4  Ambulatory status: x1  Oxygen Therapy:  ra  Cardiac Rhythm: idioventricular   Admitted from: ed  Safety Concerns:  fall risk  Social Issues: none  ED Room #:  2    ED Nurse Phone Extension - [phone] or may call [phone].      HPI:   Chief Complaint   Patient presents with    Chest Pain    Shortness of Breath       Past Medical History:  Past Medical History:   Diagnosis Date    Arrhythmia     CAD (coronary artery disease)     Carotid stenosis, asymptomatic, bilateral     Detached retina, left     Essential hypertension     Mixed hyperlipidemia     TIA (transient ischemic attack)         Past Surgical History:  Past Surgical History:   Procedure Laterality Date    CARDIAC CATHETERIZATION N/A 8/27/2018    Procedure: Left Heart Cath;  Surgeon: López Thomas MD;  Location: [facility] CATH INVASIVE LOCATION;  Service: Cardiology    CORONARY ARTERY BYPASS GRAFT      SHOULDER ACROMIOCLAVICULAR JOINT REPAIR Left         Admitting Doctor:   Brandon Rdz MD    Consulting Provider(s):  Consults       No orders found from 6/9/2024 to 7/9/2024.             Admitting Diagnosis:   The primary encounter diagnosis was Heart block. Diagnoses of Symptomatic bradycardia and Skin lesions were also pertinent to this visit.    Most Recent Vitals:   Vitals:    07/08/24 1200 07/08/24 1201 07/08/24 1202 07/08/24 1230   BP: 164/72   167/73   BP Location:       Patient Position:       Pulse: (!) 38 (!) 37 (!) 38 (!) 37   Resp:       Temp:       TempSrc:       SpO2: 93% 94% 91% 95%   Weight:       Height:           Active LDAs/IV Access:   Lines, Drains & Airways       Active LDAs       Name Placement date Placement time Site Days    Peripheral IV 07/08/24 1146 Anterior;Right Forearm 07/08/24  1146  Forearm  less than 1                    Labs (abnormal labs have a star):   Labs Reviewed   COMPREHENSIVE METABOLIC PANEL - Abnormal; Notable for the following components:       Result  Value    Glucose 122 (*)     BUN 24 (*)     Creatinine 1.31 (*)     Chloride 108 (*)     CO2 20.0 (*)     Total Bilirubin 1.6 (*)     eGFR 54.0 (*)     All other components within normal limits    Narrative:     GFR Normal >60  Chronic Kidney Disease <60  Kidney Failure <15    The GFR formula is only valid for adults with stable renal function between ages 18 and 70.   BNP (IN-HOUSE) - Abnormal; Notable for the following components:    proBNP 8,825.0 (*)     All other components within normal limits    Narrative:     This assay is used as an aid in the diagnosis of individuals suspected of having heart failure. It can be used as an aid in the diagnosis of acute decompensated heart failure (ADHF) in patients presenting with signs and symptoms of ADHF to the emergency department (ED). In addition, NT-proBNP of <300 pg/mL indicates ADHF is not likely.    Age Range Result Interpretation  NT-proBNP Concentration (pg/mL:      <50             Positive            >450                   Gray                 300-450                    Negative             <300    50-75           Positive            >900                  Gray                300-900                  Negative            <300      >75             Positive            >1800                  Gray                300-1800                  Negative            <300   SINGLE HS TROPONIN T - Abnormal; Notable for the following components:    HS Troponin T 68 (*)     All other components within normal limits    Narrative:     High Sensitive Troponin T Reference Range:  <14.0 ng/L- Negative Female for AMI  <22.0 ng/L- Negative Male for AMI  >=14 - Abnormal Female indicating possible myocardial injury.  >=22 - Abnormal Male indicating possible myocardial injury.   Clinicians would have to utilize clinical acumen, EKG, Troponin, and serial changes to determine if it is an Acute Myocardial Infarction or myocardial injury due to an underlying chronic condition.        CBC WITH  AUTO DIFFERENTIAL - Normal   TSH - Normal   RAINBOW DRAW    Narrative:     The following orders were created for panel order [place] Draw.  Procedure                               Abnormality         Status                     ---------                               -----------         ------                     Green Top (Gel)[[id number]]                                  Final result               Lavender Top[[id number]]                                     Final result               Gold Top - SST[[id number]]                                   Final result               Gray Top[[id number]]                                         Final result               Light Blue Top[[id number]]                                   Final result                 Please view results for these tests on the individual orders.   HIGH SENSITIVITIY TROPONIN T 2HR   CBC AND DIFFERENTIAL    Narrative:     The following orders were created for panel order CBC & Differential.  Procedure                               Abnormality         Status                     ---------                               -----------         ------                     CBC Auto Differential[[id number]]        Normal              Final result                 Please view results for these tests on the individual orders.   GREEN TOP   LAVENDER TOP   GOLD TOP - SST   GRAY TOP   LIGHT BLUE TOP       Meds Given in ED:   Medications   sodium chloride 0.9 % flush 10 mL (has no administration in time range)           Last NIH score:                                                          Dysphagia screening results:  Patient Factors Component (Dysphagia:Stroke or Rule-out)  Best Eye Response: 4-->(E4) spontaneous (07/08/24 1155)  Best Motor Response: 6-->(M6) obeys commands (07/08/24 1155)  Best Verbal Response: 5-->(V5) oriented (07/08/24 1155)  Y[place] Coma Scale Score: 15 (07/08/24 1155)     [place] Coma Scale:  No data recorded     CIWA:        Restraint Type:             Isolation Status:  No active isolations

## 2024-07-08 NOTE — H&P
[facility] Cardiology History and Physical    2024       Subjective:      Harpreet Ceballos  LUNA/LUNA  1940  0    Provider, No Known    had concerns including Chest Pain and Shortness of Breath.      Problem List:  Complete AV Block  CAD s/p CABG x5 2018: LIMA to D1 and LAD, SVG to OM1 and OM 2, SVG to RPDA  Ischemic Cardiomyopathy  TTE 3/2019: LVEF 56-60%, mild AI  TTE 2021: LVEF 33%  Carotid Stenosis  2019 CTA 50% R ICA stenosis, occluded right vertebral artery, mild to moderate CAD  TIA 2020 ?  Hypertension  Medical noncompliance            has No Known Allergies.    HPI:  Mr. Ceballos is an 84-year-old male with the above medical history who we are asked to evaluate for bradycardia and complete AV block.  He initially presented to the ER because he has had progressive shortness of breath since January with mild dizziness.  He was found to be in complete heart block on presenting EKG.  He reports his heart rate was previously 60+ BPM at rest, but noticed after his dog  a year ago that it has been around 40 since then.  He expected it to increase but never did.  He previously followed with Dr. Anderson but has not been seen since .  He has a history of a bypass but has not been taking aspirin, Plavix or statin.  The only medicine he reports taking his his Flomax.  He denies chest pain, palpitations, lower extremity edema or syncopal event.  His presentation is not consistent with his symptoms prior to his heart attack in 2018.          History  Family History   Problem Relation Age of Onset    No Known Problems Mother     No Known Problems Father      Past Surgical History:   Procedure Laterality Date    CARDIAC CATHETERIZATION N/A 2018    Procedure: Left Heart Cath;  Surgeon: López Thomas MD;  Location: [facility] CATH INVASIVE LOCATION;  Service: Cardiology    CORONARY ARTERY BYPASS GRAFT      SHOULDER ACROMIOCLAVICULAR JOINT REPAIR Left       Past Medical History:   Diagnosis  "Date    Arrhythmia     CAD (coronary artery disease)     Carotid stenosis, asymptomatic, bilateral     Detached retina, left     Essential hypertension     Mixed hyperlipidemia     TIA (transient ischemic attack)      Social History     Tobacco Use   Smoking Status Never   Smokeless Tobacco Never     Social History     Substance and Sexual Activity   Alcohol Use Yes    Alcohol/week: 1.0 standard drink of alcohol    Types: 1 Cans of beer per week    Comment: A BEER A DAY     Past Surgical History:   Procedure Laterality Date    CARDIAC CATHETERIZATION N/A 8/27/2018    Procedure: Left Heart Cath;  Surgeon: López Thomas MD;  Location: [facility] CATH INVASIVE LOCATION;  Service: Cardiology    CORONARY ARTERY BYPASS GRAFT      SHOULDER ACROMIOCLAVICULAR JOINT REPAIR Left             Objective:     height is 170.2 cm (67\") and weight is 74.8 kg (165 lb). His oral temperature is 98.4 °F (36.9 °C). His blood pressure is 167/73 and his pulse is 37 (abnormal). His respiration is 18 and oxygen saturation is 95%.     Physical Exam  Constitutional:       Appearance: Normal appearance.   Eyes:      General: No scleral icterus.     Conjunctiva/sclera: Conjunctivae normal.      Pupils: Pupils are equal, round, and reactive to light.   Cardiovascular:      Rate and Rhythm: Bradycardia present.      Heart sounds: Normal heart sounds. No murmur heard.     No friction rub. No gallop.   Pulmonary:      Effort: Pulmonary effort is normal.      Breath sounds: Normal breath sounds. No wheezing, rhonchi or rales.   Abdominal:      Palpations: Abdomen is soft.      Tenderness: There is no abdominal tenderness.   Musculoskeletal:      Right lower leg: No edema.      Left lower leg: No edema.   Skin:     General: Skin is warm and dry.      Comments: Large pedunculated, erythematous, small area of ulceration mass on his back.  Second smaller mass noted   Neurological:      General: No focal deficit present.      Mental Status: He is alert and " oriented to person, place, and time.   Psychiatric:         Mood and Affect: Mood normal.         Behavior: Behavior normal.         Cardiographics  ECG: Complete AV block  Echocardiogram: Pending    Imaging  XR Chest 1 View    Result Date: 7/8/2024  Impression: Enlarged cardiac silhouette, which obscures the left costophrenic angle. No acute findings otherwise. Electronically Signed: Phoenix Waite MD  7/8/2024 12:22 PM EDT  Workstation ID: [id number]       Lab Review   Lab Results   Component Value Date    GLUCOSE 122 (H) 07/08/2024    BUN 24 (H) 07/08/2024    CREATININE 1.31 (H) 07/08/2024    EGFRIFNONA 56 (L) 09/21/2021    BCR 18.3 07/08/2024    CO2 20.0 (L) 07/08/2024    CALCIUM 9.1 07/08/2024    ALBUMIN 4.2 07/08/2024    AST 15 07/08/2024    ALT 11 07/08/2024     Lab Results   Component Value Date    WBC 8.03 07/08/2024    HGB 16.7 07/08/2024    HCT 50.9 07/08/2024    MCV 96.2 07/08/2024     07/08/2024     Lab Results   Component Value Date    CHOL 179 09/21/2021    CHOL 188 09/10/2020    CHOL 153 03/02/2020     Lab Results   Component Value Date    TRIG 208 (H) 09/21/2021    TRIG 216 (H) 09/10/2020    TRIG 236 (H) 03/02/2020     Lab Results   Component Value Date    HDL 40 09/21/2021    HDL 43 09/10/2020    HDL 42 03/02/2020           Assessment:   Complete AV block  History of CAD s/p CABG x 5 in 2018 (Dr. Woodward, [facility])  Elevated Troponin  Suspect type II demand ischemia, will continue to trend troponin  Medical noncompliance        Plan:   Clear indication for pacemaker implantation due to complete AV block.  The risks of not proceeding with pacemaker were discussed with the patient in detail including a small risk of mortality, but a higher risk of having significant functionally limiting symptoms.  He has likely had complete AV block at least intermittently for a long time so there is no emergent indication for pacemaker.  A pacemaker would very likely significantly increases symptoms.  Patient is  very hesitant to proceed with pacemaker.    We will document echocardiogram to assess whether we would need a biventricular device/left bundle area lead versus leadless pacemaker and admit the patient to cardiology  We could proceed with pacemaker implantation later this afternoon if patient changes his mind and wants to proceed.  He wants to speak with Dr. Anderson first if possible and think over it for a while.  Discussion about necessity for aspirin and statin with history of CABG. He is open to this prior to discharge, but declines today until after pacemaker implantation.  Will likely need to refer to dermatology at  on discharge to assess his pedunculated mass on his back concerning for cancer    *Of note, patient is a [employer] and would decline blood products if they became necessary    Scribed by Otoniel Palacios PA-C for Dr. Brandon Rdz MD    I, Brandon Rdz MD, personally performed the services described in this documentation as scribed by the above named individual in my presence, and it is both accurate and complete.  7/9/2024  12:37 EDT      ADDENDUM: Patient agreeable to pacemaker implantation but made decision too late to fit in today.  There was not available labs base.  Will see if Dr. Perez can perform pacemaker implantation (BiV vs left bundle area lead). Will make patient npo at midnight for tentative procedure tomorrow.    Electronically signed by Otoniel Palacios PA-C, 07/08/24, 7:48 PM EDT.

## 2024-07-08 NOTE — CASE MANAGEMENT/SOCIAL WORK
Discharge Planning Assessment  [facility]     Patient Name: Harpreet Ceballos  MRN: [id number]  Today's Date: 7/8/2024    Admit Date: 7/8/2024    Plan: home   Discharge Needs Assessment       Row Name 07/08/24 1453       Living Environment    People in Home spouse    Current Living Arrangements home    Primary Care Provided by self;spouse/significant other       Transition Planning    Patient/Family Anticipates Transition to home with family       Discharge Needs Assessment    Readmission Within the Last 30 Days no previous admission in last 30 days    Equipment Currently Used at Home none    Concerns to be Addressed basic needs;discharge planning                   Discharge Plan       Row Name 07/08/24 1454       Plan    Plan home    Patient/Family in Agreement with Plan yes    Plan Comments I met with this patient and his daughter bedside. He lives with his wife in [facility]. He does not have a PCP, but states that he will want to set one up himself if he decides to. He is independent with self care, but needs assistance with all other activities of daily living. He is independent with mobility. He anticipates returning home after this hospitalization, and his daughter can transport. Case management will follow.    Final Discharge Disposition Code 01 - home or self-care                  Continued Care and Services - Admitted Since 7/8/2024    No active coordination exists for this encounter.          Demographic Summary       Row Name 07/08/24 1453       General Information    General Information Comments Mr Ceballos does not have a PCP. He has Medicare AB                   Functional Status       Row Name 07/08/24 1453       Functional Status, IADL    Medications independent    Meal Preparation completely dependent    Housekeeping completely dependent    Laundry completely dependent    Shopping completely dependent                   Psychosocial    No documentation.                  Abuse/Neglect    No  documentation.                  Legal    No documentation.                  Substance Abuse    No documentation.                  Patient Forms    No documentation.                     Sonya Whatley RN

## 2024-07-08 NOTE — Clinical Note
A 6 fr sheath was successfully inserted into the left subclavian vein. Sheath insertion not delayed.

## 2024-07-08 NOTE — Clinical Note
Pocket closure in process. Transition of Care Plan:    RUR: 13%   Prior Level of Functioning: Min-Mod A   Disposition: [facility] Report# [phone] fax: [phone]   Follow up appointments: PCP   DME needed: None   Transportation at discharge: [address] 1:30 pm \"medicare\"   IM/IMM Medicare/ letter given: Received   Caregiver Contact: Taina Barajas (Child)  [phone]  Discharge Caregiver contacted prior to discharge? Yes   Care Conference needed? No       Per conversation with the nursing manager-Afsaneh [phone]; this cm inquired if the pt could return today following her bedside assessment. She reported that she would not be able to complete the assessment today and that they did not accept admissions on Friday. This cm inquired if they would be able to make an exception for the pt as she is medically stable for discharge and therapy conducted their evaluation. This cm informed her that the pt is not in any distress at the moment and and stated that the pt returning to a familiar environment would be the most conducive for the pt. This cm proceeded to review over the therapy notes and faxed a copy to the facility for their review per her request. She reported that their policy is to evaluate pt's prior to admission if the pt has been admitted in the hospital for 3 days but she reported that they would be willing to take the pt back today, based off of the information provided. She reported that the pt needs to admit prior to 3 pm and if the pt is starting a new medication a hard script would be needed.

## 2024-07-08 NOTE — Clinical Note
"This patient does have evidence of infective focus  My overall impression is sepsis.  Source: Skin and Soft Tissue (location shoulder)  Antibiotics given-   Antibiotics (72h ago, onward)      Start     Stop Route Frequency Ordered    04/16/24 0300  vancomycin 1,500 mg in dextrose 5 % (D5W) 250 mL IVPB (Vial-Mate)         -- IV Every 12 hours (non-standard times) 04/15/24 1520    04/15/24 1445  ceFEPIme (MAXIPIME) 2 g in dextrose 5 % in water (D5W) 100 mL IVPB (MB+)         -- IV Every 8 hours (non-standard times) 04/15/24 1338    04/15/24 1437  vancomycin - pharmacy to dose  (vancomycin IVPB (PEDS and ADULTS))        Placed in "And" Linked Group    -- IV pharmacy to manage frequency 04/15/24 1338          Latest lactate reviewed-  Recent Labs   Lab 04/15/24  0946   LACTATE 1.1       Organ dysfunction indicated by  none    Fluid challenge Ideal Body Weight- The patient's ideal body weight is Ideal body weight: 61.6 kg (135 lb 12.9 oz) which will be used to calculate fluid bolus of 30 ml/kg for treatment of septic shock.      Post- resuscitation assessment Yes Perfusion exam was performed within 6 hours of septic shock presentation after bolus shows Adequate tissue perfusion assessed by non-invasive monitoring       Will Not start Pressors- Levophed for MAP of 65  Source control achieved by: abx ordered. BC cultures sent and pending  " A sheath was successfully inserted into the left subclavian vein. Sheath insertion not delayed.

## 2024-07-09 LAB
ASCENDING AORTA: 4.2 CM
BH CV ECHO MEAS - AI P1/2T: 950.7 MSEC
BH CV ECHO MEAS - AO MAX PG: 10.2 MMHG
BH CV ECHO MEAS - AO MEAN PG: 4.7 MMHG
BH CV ECHO MEAS - AO ROOT DIAM: 4.4 CM
BH CV ECHO MEAS - AO V2 MAX: 159 CM/SEC
BH CV ECHO MEAS - AO V2 VTI: 33.3 CM
BH CV ECHO MEAS - AVA(I,D): 2.18 CM2
BH CV ECHO MEAS - EDV(CUBED): 175.6 ML
BH CV ECHO MEAS - EDV(MOD-SP2): 225 ML
BH CV ECHO MEAS - EDV(MOD-SP4): 283 ML
BH CV ECHO MEAS - EF(MOD-BP): 43.8 %
BH CV ECHO MEAS - EF(MOD-SP2): 37.8 %
BH CV ECHO MEAS - EF(MOD-SP4): 46.6 %
BH CV ECHO MEAS - ESV(CUBED): 125 ML
BH CV ECHO MEAS - ESV(MOD-SP2): 140 ML
BH CV ECHO MEAS - ESV(MOD-SP4): 151 ML
BH CV ECHO MEAS - FS: 10.7 %
BH CV ECHO MEAS - IVS/LVPW: 1 CM
BH CV ECHO MEAS - IVSD: 1.15 CM
BH CV ECHO MEAS - LA DIMENSION: 5.4 CM
BH CV ECHO MEAS - LAT PEAK E' VEL: 6.9 CM/SEC
BH CV ECHO MEAS - LV MASS(C)D: 264.7 GRAMS
BH CV ECHO MEAS - LV MAX PG: 5.4 MMHG
BH CV ECHO MEAS - LV MEAN PG: 2 MMHG
BH CV ECHO MEAS - LV V1 MAX: 116 CM/SEC
BH CV ECHO MEAS - LV V1 VTI: 23.1 CM
BH CV ECHO MEAS - LVIDD: 5.6 CM
BH CV ECHO MEAS - LVIDS: 5 CM
BH CV ECHO MEAS - LVOT AREA: 3.1 CM2
BH CV ECHO MEAS - LVOT DIAM: 2 CM
BH CV ECHO MEAS - LVPWD: 1.15 CM
BH CV ECHO MEAS - MED PEAK E' VEL: 4.9 CM/SEC
BH CV ECHO MEAS - MR MAX PG: 79.9 MMHG
BH CV ECHO MEAS - MR MAX VEL: 447 CM/SEC
BH CV ECHO MEAS - MV A MAX VEL: 39.3 CM/SEC
BH CV ECHO MEAS - MV DEC SLOPE: 422 CM/SEC2
BH CV ECHO MEAS - MV DEC TIME: 0.23 SEC
BH CV ECHO MEAS - MV E MAX VEL: 97.9 CM/SEC
BH CV ECHO MEAS - MV E/A: 2.49
BH CV ECHO MEAS - MV MAX PG: 3.5 MMHG
BH CV ECHO MEAS - MV MEAN PG: 1 MMHG
BH CV ECHO MEAS - MV V2 VTI: 62.1 CM
BH CV ECHO MEAS - MVA(VTI): 1.17 CM2
BH CV ECHO MEAS - PA ACC TIME: 0.11 SEC
BH CV ECHO MEAS - RAP SYSTOLE: 15 MMHG
BH CV ECHO MEAS - RVSP: 69 MMHG
BH CV ECHO MEAS - SV(LVOT): 72.6 ML
BH CV ECHO MEAS - SV(MOD-SP2): 85 ML
BH CV ECHO MEAS - SV(MOD-SP4): 132 ML
BH CV ECHO MEAS - TAPSE (>1.6): 1.1 CM
BH CV ECHO MEAS - TR MAX PG: 53.9 MMHG
BH CV ECHO MEAS - TR MAX VEL: 309.4 CM/SEC
BH CV ECHO MEASUREMENTS AVERAGE E/E' RATIO: 16.59
BH CV XLRA - RV BASE: 4.8 CM
BH CV XLRA - RV LENGTH: 7.4 CM
BH CV XLRA - RV MID: 3.1 CM
BH CV XLRA - TDI S': 4.5 CM/SEC
LEFT ATRIUM VOLUME INDEX: 58.6 ML/M2

## 2024-07-09 PROCEDURE — C1769 GUIDE WIRE: HCPCS | Performed by: INTERNAL MEDICINE

## 2024-07-09 PROCEDURE — C1892 INTRO/SHEATH,FIXED,PEEL-AWAY: HCPCS | Performed by: INTERNAL MEDICINE

## 2024-07-09 PROCEDURE — C2621 PMKR, OTHER THAN SING/DUAL: HCPCS | Performed by: INTERNAL MEDICINE

## 2024-07-09 PROCEDURE — 25010000002 CEFAZOLIN PER 500 MG

## 2024-07-09 PROCEDURE — 25010000002 MIDAZOLAM PER 1 MG: Performed by: INTERNAL MEDICINE

## 2024-07-09 PROCEDURE — 02H63JZ INSERTION OF PACEMAKER LEAD INTO RIGHT ATRIUM, PERCUTANEOUS APPROACH: ICD-10-PCS | Performed by: INTERNAL MEDICINE

## 2024-07-09 PROCEDURE — 33208 INSRT HEART PM ATRIAL & VENT: CPT | Performed by: INTERNAL MEDICINE

## 2024-07-09 PROCEDURE — 33225 L VENTRIC PACING LEAD ADD-ON: CPT | Performed by: INTERNAL MEDICINE

## 2024-07-09 PROCEDURE — 0JH607Z INSERTION OF CARDIAC RESYNCHRONIZATION PACEMAKER PULSE GENERATOR INTO CHEST SUBCUTANEOUS TISSUE AND FASCIA, OPEN APPROACH: ICD-10-PCS | Performed by: INTERNAL MEDICINE

## 2024-07-09 PROCEDURE — C1900 LEAD, CORONARY VENOUS: HCPCS | Performed by: INTERNAL MEDICINE

## 2024-07-09 PROCEDURE — 25010000002 FENTANYL CITRATE (PF) 50 MCG/ML SOLUTION: Performed by: INTERNAL MEDICINE

## 2024-07-09 PROCEDURE — 25810000003 SODIUM CHLORIDE 0.9 % SOLUTION: Performed by: INTERNAL MEDICINE

## 2024-07-09 PROCEDURE — 99152 MOD SED SAME PHYS/QHP 5/>YRS: CPT | Performed by: INTERNAL MEDICINE

## 2024-07-09 PROCEDURE — 25510000001 IOPAMIDOL PER 1 ML: Performed by: INTERNAL MEDICINE

## 2024-07-09 PROCEDURE — 02H43JZ INSERTION OF PACEMAKER LEAD INTO CORONARY VEIN, PERCUTANEOUS APPROACH: ICD-10-PCS | Performed by: INTERNAL MEDICINE

## 2024-07-09 PROCEDURE — 02HK3JZ INSERTION OF PACEMAKER LEAD INTO RIGHT VENTRICLE, PERCUTANEOUS APPROACH: ICD-10-PCS | Performed by: INTERNAL MEDICINE

## 2024-07-09 PROCEDURE — C1887 CATHETER, GUIDING: HCPCS | Performed by: INTERNAL MEDICINE

## 2024-07-09 PROCEDURE — 99153 MOD SED SAME PHYS/QHP EA: CPT | Performed by: INTERNAL MEDICINE

## 2024-07-09 PROCEDURE — 25010000002 CEFAZOLIN PER 500 MG: Performed by: INTERNAL MEDICINE

## 2024-07-09 PROCEDURE — 02HL3JZ INSERTION OF PACEMAKER LEAD INTO LEFT VENTRICLE, PERCUTANEOUS APPROACH: ICD-10-PCS | Performed by: INTERNAL MEDICINE

## 2024-07-09 PROCEDURE — 25010000002 BUPIVACAINE 0.5 % SOLUTION: Performed by: INTERNAL MEDICINE

## 2024-07-09 PROCEDURE — C1898 LEAD, PMKR, OTHER THAN TRANS: HCPCS | Performed by: INTERNAL MEDICINE

## 2024-07-09 PROCEDURE — 25010000002 ONDANSETRON PER 1 MG: Performed by: INTERNAL MEDICINE

## 2024-07-09 PROCEDURE — 94660 CPAP INITIATION&MGMT: CPT

## 2024-07-09 DEVICE — PACE/SENSE LEAD
Type: IMPLANTABLE DEVICE | Status: FUNCTIONAL
Brand: INGEVITY™+

## 2024-07-09 DEVICE — PACE/SENSE LEAD
Type: IMPLANTABLE DEVICE | Status: FUNCTIONAL
Brand: ACUITY™ X4 SPIRAL S

## 2024-07-09 DEVICE — CARDIAC RESYNCHRONIZATION THERAPY PACEMAKER
Type: IMPLANTABLE DEVICE | Status: FUNCTIONAL
Brand: VISIONIST™ X4 CRT-P

## 2024-07-09 RX ORDER — LIDOCAINE HYDROCHLORIDE 10 MG/ML
INJECTION, SOLUTION EPIDURAL; INFILTRATION; INTRACAUDAL; PERINEURAL
Status: DISCONTINUED | OUTPATIENT
Start: 2024-07-09 | End: 2024-07-09 | Stop reason: HOSPADM

## 2024-07-09 RX ORDER — ONDANSETRON 2 MG/ML
INJECTION INTRAMUSCULAR; INTRAVENOUS
Status: DISCONTINUED | OUTPATIENT
Start: 2024-07-09 | End: 2024-07-09 | Stop reason: HOSPADM

## 2024-07-09 RX ORDER — SODIUM CHLORIDE 0.9 % (FLUSH) 0.9 %
10 SYRINGE (ML) INJECTION AS NEEDED
Status: DISCONTINUED | OUTPATIENT
Start: 2024-07-09 | End: 2024-07-11

## 2024-07-09 RX ORDER — BUPIVACAINE HYDROCHLORIDE 5 MG/ML
INJECTION, SOLUTION PERINEURAL
Status: DISCONTINUED | OUTPATIENT
Start: 2024-07-09 | End: 2024-07-09 | Stop reason: HOSPADM

## 2024-07-09 RX ORDER — CEFAZOLIN SODIUM 1 G/3ML
INJECTION, POWDER, FOR SOLUTION INTRAMUSCULAR; INTRAVENOUS
Status: DISCONTINUED | OUTPATIENT
Start: 2024-07-09 | End: 2024-07-09 | Stop reason: HOSPADM

## 2024-07-09 RX ORDER — LOSARTAN POTASSIUM 50 MG/1
50 TABLET ORAL
Status: DISCONTINUED | OUTPATIENT
Start: 2024-07-09 | End: 2024-07-10

## 2024-07-09 RX ORDER — SODIUM CHLORIDE 9 MG/ML
40 INJECTION, SOLUTION INTRAVENOUS AS NEEDED
Status: DISCONTINUED | OUTPATIENT
Start: 2024-07-09 | End: 2024-07-11

## 2024-07-09 RX ORDER — ROSUVASTATIN CALCIUM 20 MG/1
20 TABLET, COATED ORAL NIGHTLY
Status: DISCONTINUED | OUTPATIENT
Start: 2024-07-09 | End: 2024-07-11 | Stop reason: HOSPADM

## 2024-07-09 RX ORDER — ASPIRIN 81 MG/1
81 TABLET ORAL DAILY
Status: DISCONTINUED | OUTPATIENT
Start: 2024-07-10 | End: 2024-07-11 | Stop reason: HOSPADM

## 2024-07-09 RX ORDER — SODIUM CHLORIDE 0.9 % (FLUSH) 0.9 %
10 SYRINGE (ML) INJECTION EVERY 12 HOURS SCHEDULED
Status: DISCONTINUED | OUTPATIENT
Start: 2024-07-09 | End: 2024-07-11

## 2024-07-09 RX ORDER — MIDAZOLAM HYDROCHLORIDE 1 MG/ML
INJECTION INTRAMUSCULAR; INTRAVENOUS
Status: DISCONTINUED | OUTPATIENT
Start: 2024-07-09 | End: 2024-07-09 | Stop reason: HOSPADM

## 2024-07-09 RX ORDER — SODIUM CHLORIDE 9 MG/ML
INJECTION, SOLUTION INTRAVENOUS
Status: COMPLETED | OUTPATIENT
Start: 2024-07-09 | End: 2024-07-09

## 2024-07-09 RX ORDER — FENTANYL CITRATE 50 UG/ML
INJECTION, SOLUTION INTRAMUSCULAR; INTRAVENOUS
Status: DISCONTINUED | OUTPATIENT
Start: 2024-07-09 | End: 2024-07-09 | Stop reason: HOSPADM

## 2024-07-09 RX ADMIN — SODIUM CHLORIDE 2000 MG: 900 INJECTION INTRAVENOUS at 13:42

## 2024-07-09 RX ADMIN — TAMSULOSIN HYDROCHLORIDE 0.8 MG: 0.4 CAPSULE ORAL at 20:05

## 2024-07-09 RX ADMIN — LOSARTAN POTASSIUM 50 MG: 50 TABLET, FILM COATED ORAL at 17:58

## 2024-07-09 RX ADMIN — ROSUVASTATIN CALCIUM 20 MG: 20 TABLET, COATED ORAL at 20:05

## 2024-07-09 NOTE — DISCHARGE INSTRUCTIONS
DR. THOMSON DEVICE IMPLANTATION  Pressure Dressing from device site will be removed the morning after procedure or prior to   discharge if the patient goes home the same day. Patient will have an Aquacel dressing   underneath. Typically, no steri-strips or glue is used. The Aquacel Dressing will be removed at   the wound check appointment in 7-10 days.   Please do not lift more than 10 pounds or raise the affected arm above the shoulder for 6 weeks   after the device was implanted (this does not apply to subcutaneous ICDs).  Avoid activities that involve heavy lifting or rough contact that could result in blows to your   implant site. This allows the incision time to heal.  You may shower the day after your procedure.   Do not apply creams, lotions or powders to the incision.   Please avoid allowing a bra strap or suspenders to lay over the incision until it is completely   healed.   Wear your sling at bedtime for 4 weeks.  No baths, hot tubs or swimming for 4 weeks.   No driving for preferably 4 weeks, but at a minimum 2 weeks.   Call your doctor if you have any swelling, redness or discharge around your incision, notice   anything unusual or unexpected or you develop a fever that does not go away in two to three   days.   Call your doctor if you hear any beeping sounds/vibratory alerts from your device as this   indicates your device needs to be checked immediately.  You will be scheduled for a 7-10 day wound check appointment and a 3 month follow up to   check your device.   Carry your medical device ID card with you at all times.   Please call our office at [phone] with any questions about the device or incision.

## 2024-07-09 NOTE — PROGRESS NOTES
Plan of Care  Pt remained with heart rates in the 30s throughout the night with intermittent complete AV block and 2:1 A block  No other changes overnight  Patient quite disgruntled about some issues with making it to the bathroom overnight and having to wait until potentially late in the afternoon for pacemaker. He wishes to be discharged home if we can not move his pacemaker implantation up    I will see what I can do about moving his procedure up but we will likely have to discharge the patient based on his wishes and have him scheduled outpatient for pacemaker. He understands the risks of going home with intermittent complete AV block and wishes to be discharged anyway.    Electronically signed by Otoniel Palacios PA-C, 07/09/24, 8:20 AM EDT.

## 2024-07-09 NOTE — PLAN OF CARE
Goal Outcome Evaluation:      No changes this shift, heart rate in the 30's at rest, O2 high 80's, 2L NC applied, soa at rest. A&Ox4, up with stand  by assist., complete heart block NPO for pacemaker implantation today.

## 2024-07-09 NOTE — PLAN OF CARE
Goal Outcome Evaluation:      -Pacemaker placed today   -Pt now v-paced on monitor   -Losartan added for hypertension   -Plan to d/c home tomorrow   -Safety and fall precautions in place

## 2024-07-10 ENCOUNTER — APPOINTMENT (OUTPATIENT)
Dept: GENERAL RADIOLOGY | Facility: HOSPITAL | Age: 84
End: 2024-07-10
Payer: MEDICARE

## 2024-07-10 PROBLEM — T82.110A PACEMAKER LEAD MALFUNCTION: Status: ACTIVE | Noted: 2024-07-08

## 2024-07-10 LAB
QT INTERVAL: 582 MS
QTC INTERVAL: 430 MS

## 2024-07-10 PROCEDURE — 25810000003 SODIUM CHLORIDE 0.9 % SOLUTION: Performed by: STUDENT IN AN ORGANIZED HEALTH CARE EDUCATION/TRAINING PROGRAM

## 2024-07-10 PROCEDURE — 99152 MOD SED SAME PHYS/QHP 5/>YRS: CPT | Performed by: INTERNAL MEDICINE

## 2024-07-10 PROCEDURE — 25010000002 ONDANSETRON PER 1 MG: Performed by: STUDENT IN AN ORGANIZED HEALTH CARE EDUCATION/TRAINING PROGRAM

## 2024-07-10 PROCEDURE — 25010000002 MIDAZOLAM PER 1 MG: Performed by: STUDENT IN AN ORGANIZED HEALTH CARE EDUCATION/TRAINING PROGRAM

## 2024-07-10 PROCEDURE — 33215 REPOSITION PACING-DEFIB LEAD: CPT | Performed by: INTERNAL MEDICINE

## 2024-07-10 PROCEDURE — 25010000002 FENTANYL CITRATE (PF) 50 MCG/ML SOLUTION: Performed by: STUDENT IN AN ORGANIZED HEALTH CARE EDUCATION/TRAINING PROGRAM

## 2024-07-10 PROCEDURE — 33226 REPOSITION L VENTRIC LEAD: CPT | Performed by: INTERNAL MEDICINE

## 2024-07-10 PROCEDURE — 25010000002 CEFAZOLIN PER 500 MG: Performed by: STUDENT IN AN ORGANIZED HEALTH CARE EDUCATION/TRAINING PROGRAM

## 2024-07-10 PROCEDURE — 99153 MOD SED SAME PHYS/QHP EA: CPT | Performed by: INTERNAL MEDICINE

## 2024-07-10 PROCEDURE — 25010000002 BUPIVACAINE 0.5 % SOLUTION: Performed by: STUDENT IN AN ORGANIZED HEALTH CARE EDUCATION/TRAINING PROGRAM

## 2024-07-10 PROCEDURE — 02WA3MZ REVISION OF CARDIAC LEAD IN HEART, PERCUTANEOUS APPROACH: ICD-10-PCS | Performed by: INTERNAL MEDICINE

## 2024-07-10 PROCEDURE — 71046 X-RAY EXAM CHEST 2 VIEWS: CPT

## 2024-07-10 PROCEDURE — 93005 ELECTROCARDIOGRAM TRACING: CPT | Performed by: INTERNAL MEDICINE

## 2024-07-10 PROCEDURE — C1769 GUIDE WIRE: HCPCS | Performed by: INTERNAL MEDICINE

## 2024-07-10 RX ORDER — LIDOCAINE HYDROCHLORIDE 5 MG/ML
INJECTION, SOLUTION INFILTRATION; PERINEURAL
Status: DISCONTINUED | OUTPATIENT
Start: 2024-07-10 | End: 2024-07-10 | Stop reason: HOSPADM

## 2024-07-10 RX ORDER — SODIUM CHLORIDE 0.9 % (FLUSH) 0.9 %
10 SYRINGE (ML) INJECTION AS NEEDED
Status: DISCONTINUED | OUTPATIENT
Start: 2024-07-10 | End: 2024-07-11 | Stop reason: HOSPADM

## 2024-07-10 RX ORDER — SODIUM CHLORIDE 0.9 % (FLUSH) 0.9 %
10 SYRINGE (ML) INJECTION EVERY 12 HOURS SCHEDULED
Status: DISCONTINUED | OUTPATIENT
Start: 2024-07-10 | End: 2024-07-11 | Stop reason: HOSPADM

## 2024-07-10 RX ORDER — FENTANYL CITRATE 50 UG/ML
INJECTION, SOLUTION INTRAMUSCULAR; INTRAVENOUS
Status: DISCONTINUED | OUTPATIENT
Start: 2024-07-10 | End: 2024-07-10 | Stop reason: HOSPADM

## 2024-07-10 RX ORDER — ONDANSETRON 2 MG/ML
INJECTION INTRAMUSCULAR; INTRAVENOUS
Status: DISCONTINUED | OUTPATIENT
Start: 2024-07-10 | End: 2024-07-10 | Stop reason: HOSPADM

## 2024-07-10 RX ORDER — BUPIVACAINE HYDROCHLORIDE 5 MG/ML
INJECTION, SOLUTION PERINEURAL
Status: DISCONTINUED | OUTPATIENT
Start: 2024-07-10 | End: 2024-07-10 | Stop reason: HOSPADM

## 2024-07-10 RX ORDER — SODIUM CHLORIDE 9 MG/ML
40 INJECTION, SOLUTION INTRAVENOUS AS NEEDED
Status: DISCONTINUED | OUTPATIENT
Start: 2024-07-10 | End: 2024-07-11 | Stop reason: HOSPADM

## 2024-07-10 RX ORDER — LOSARTAN POTASSIUM 50 MG/1
100 TABLET ORAL
Status: DISCONTINUED | OUTPATIENT
Start: 2024-07-10 | End: 2024-07-10

## 2024-07-10 RX ORDER — SODIUM CHLORIDE 9 MG/ML
INJECTION, SOLUTION INTRAVENOUS
Status: COMPLETED | OUTPATIENT
Start: 2024-07-10 | End: 2024-07-10

## 2024-07-10 RX ORDER — LOSARTAN POTASSIUM 50 MG/1
50 TABLET ORAL
Status: DISCONTINUED | OUTPATIENT
Start: 2024-07-10 | End: 2024-07-11

## 2024-07-10 RX ORDER — MIDAZOLAM HYDROCHLORIDE 1 MG/ML
INJECTION INTRAMUSCULAR; INTRAVENOUS
Status: DISCONTINUED | OUTPATIENT
Start: 2024-07-10 | End: 2024-07-10 | Stop reason: HOSPADM

## 2024-07-10 RX ADMIN — ASPIRIN 81 MG: 81 TABLET, COATED ORAL at 08:22

## 2024-07-10 RX ADMIN — Medication 10 ML: at 20:16

## 2024-07-10 RX ADMIN — TAMSULOSIN HYDROCHLORIDE 0.8 MG: 0.4 CAPSULE ORAL at 20:16

## 2024-07-10 RX ADMIN — ROSUVASTATIN CALCIUM 20 MG: 20 TABLET, COATED ORAL at 20:16

## 2024-07-10 RX ADMIN — LOSARTAN POTASSIUM 50 MG: 50 TABLET, FILM COATED ORAL at 08:22

## 2024-07-10 NOTE — PROGRESS NOTES
"  [facility]  CARDIAC EP NONBILLABLE PROGRESS NOTE    Date of Admission: 7/8/2024  Date of Service: 07/10/24    Primary Care Physician: Provider, No Known    Chief Complaint: Complete AV Block      Subjective      HPI: Patient with failure to capture this morning around 0400. CXR showed dislodged LV lead and probable dislodged RV lead. Patient asymptomatic although CHB in 30s bpm. Reprogrammed device for high output to LV lead which now captures but will require revision later today      Objective   Vitals: /89 (BP Location: Right arm, Patient Position: Lying)   Pulse 62   Temp 98 °F (36.7 °C) (Oral)   Resp 18   Ht 170.2 cm (67.01\")   Wt 74.8 kg (164 lb 14.5 oz)   SpO2 96%   BMI 25.82 kg/m²     Physical Exam:   GENERAL: Alert, cooperative, in no acute distress.   HEART: Gopi rate and regular rhythm; no murmurs, rubs or gallops  LUNGS: Clear to auscultation bilaterally. No wheezing, rales or rhonchi. Nonlabored breathing  NEUROLOGIC: No gross focal abnormalities  EXTREMITIES: No obvious deformities, cyanosis, or edema noted.   PSYCH: normal mood, behavior    Results:  Results from last 7 days   Lab Units 07/08/24  1145   WBC 10*3/mm3 8.03   HEMOGLOBIN g/dL 16.7   HEMATOCRIT % 50.9   PLATELETS 10*3/mm3 232     Results from last 7 days   Lab Units 07/08/24  1145   SODIUM mmol/L 142   POTASSIUM mmol/L 4.2   CHLORIDE mmol/L 108*   CO2 mmol/L 20.0*   BUN mg/dL 24*   CREATININE mg/dL 1.31*   GLUCOSE mg/dL 122*      Lab Results   Component Value Date    CHOL 179 09/21/2021    TRIG 208 (H) 09/21/2021    HDL 40 09/21/2021     (H) 09/21/2021    AST 15 07/08/2024    ALT 11 07/08/2024             Results from last 7 days   Lab Units 07/08/24  1145   TSH uIU/mL 1.710             Results from last 7 days   Lab Units 07/08/24  1359 07/08/24  1145   HSTROP T ng/L 56* 68*     Results from last 7 days   Lab Units 07/08/24  1145   PROBNP pg/mL 8,825.0*         Intake/Output " Summary (Last 24 hours) at 7/10/2024 1306  Last data filed at 7/10/2024 0930  Gross per 24 hour   Intake 500 ml   Output 125 ml   Net 375 ml       I personally reviewed the patient's EKG/Telemetry data    Radiology Data:   XR Chest PA & Lateral    Result Date: 7/10/2024  Impression: Interval pacemaker placement. No pneumothorax. No acute process. Electronically Signed: Mya Acosta MD  7/10/2024 7:43 AM EDT  Workstation ID: [id number]    XR Chest 1 View    Result Date: 7/8/2024  Impression: Enlarged cardiac silhouette, which obscures the left costophrenic angle. No acute findings otherwise. Electronically Signed: Phoenix Waite MD  7/8/2024 12:22 PM EDT  Workstation ID: [id number]       Current Medications:  aspirin, 81 mg, Oral, Daily  ceFAZolin, 2,000 mg, Intravenous, Once  losartan, 50 mg, Oral, Q24H  rosuvastatin, 20 mg, Oral, Nightly  sodium chloride, 10 mL, Intravenous, Q12H  tamsulosin, 0.8 mg, Oral, Nightly           Assessment:   Complete AV block  S/p BSC CRT-P 7/9/24 by Dr. Perez  Complicated by lead dislodgement with failure to capture overnight  Plan for revision 7/10/2024  ICM/CAD s/p CABG x 5 in 2018 (Dr. Woodward, [facility])  TTE 7/8/23: LVEF 36-40%, mild conc LVH, mod AI, RVSP >55           Plan:   Now s/p BSC Biventricular Pacemaker/CRT-P  Unfortunately, both ventricular leads seem to have been dislodged and had failure to capture this morning until LV was reprogrammed with high output resulting in capture. Plan to proceed with lead revision later today with Dr. Perez or Dr. Rdz depending on availability. NPO for procedure  Patient only on Flomax at home. Added aspirin, Crestor and Losartan for CAD and HFrEF  Will likely need to refer to dermatology at  on discharge to assess his pedunculated mass on his back concerning for cancer     *Of note, patient is a [employer] and would decline blood products if they became necessary    The nature of biventricular lead revision was discussed with the patient  as well as the benefits, risks alternatives to the procedure.  He expressed understanding and wishes to proceed.    Electronically signed by Otoniel Palacios PA-C, 07/10/24, 1:18 PM EDT.

## 2024-07-10 NOTE — CASE MANAGEMENT/SOCIAL WORK
Continued Stay Note   [facility]     Patient Name: Harpreet Ceballos  MRN: [id number]  Today's Date: 7/10/2024    Admit Date: 7/8/2024    Plan: home   Discharge Plan       Row Name 07/10/24 0755       Plan    Plan home    Patient/Family in Agreement with Plan yes    Plan Comments I met with this patient bedside. His plan remains home at discharge with his daughter to transport. CM to follow.    Final Discharge Disposition Code 01 - home or self-care                   Discharge Codes    No documentation.                 Expected Discharge Date and Time       Expected Discharge Date Expected Discharge Time    Jul 12, 2024               Sonya Whatley RN

## 2024-07-10 NOTE — PLAN OF CARE
Goal Outcome Evaluation:      -VSS, RA-2L NC  -Pacemaker lead revision done today   -Pt currently AV paced on monitor   -Pt recovering well, safety interventions in place

## 2024-07-10 NOTE — DISCHARGE SUMMARY
Physician Discharge Summary     Patient ID:  Harpreet Ceballos  [id number]  84 y.o.  1940    Admit date: 2024    Discharge date and time: 2024 midday    Admitting Physician: Brandon Rdz MD     Primary Physician: Provider, No Known    Discharge Physician: Otoniel Palacios PA-C    Admission Diagnoses: Complete heart block [I44.2]    Discharge Diagnoses:   Patient Active Problem List    Diagnosis     *Complete heart block [I44.2]     Pacemaker lead malfunction [T82.110A]     Carotid stenosis, asymptomatic, bilateral [I65.23]     Coronary artery disease involving native coronary artery of native heart without angina pectoris [I25.10]     Essential hypertension [I10]     Mixed hyperlipidemia [E78.2]      Problem List:  Complete AV Block  S/p BSC BiV PM by Dr. Oscar Perez 24  CAD s/p CABG x5 2018: LIMA to D1 and LAD, SVG to OM1 and OM 2, SVG to RPDA  Ischemic Cardiomyopathy  TTE 3/2019: LVEF 56-60%, mild AI  TTE 2021: LVEF 33%  TTE 2024: LVEF 36-40%, mild concentric LVH, basal inferior akinesis, various hypokinetic walls, moderate aortic insufficiency, RVSP >55  Carotid Stenosis  2019 CTA 50% R ICA stenosis, occluded right vertebral artery, mild to moderate CAD  TIA 2020 ?  Hypertension  Medical noncompliance    Presenting HPI 2024:  Mr. Ceballos is an 84-year-old male with the above medical history who we are asked to evaluate for bradycardia and complete AV block.  He initially presented to the ER because he has had progressive shortness of breath since January with mild dizziness.  He was found to be in complete heart block on presenting EKG.  He reports his heart rate was previously 60+ BPM at rest, but noticed after his dog  a year ago that it has been around 40 since then.  He expected it to increase but never did.  He previously followed with Dr. Anderson but has not been seen since .  He has a history of a bypass but has not been taking aspirin, Plavix or statin.  The only  medicine he reports taking his his Flomax.  He denies chest pain, palpitations, lower extremity edema or syncopal event.  His presentation is not consistent with his symptoms prior to his heart attack in 2018.     Hospital Course:   Patient underwent biventricular pacemaker implantation by Dr. Perez on 7/9/2024.  Patient was then started on Crestor 20, aspirin 81 mg daily for history of CAD and losartan 50 mg was started for HFrEF and hypertension. Unfortunately, it was noticed that he had loss of capture at around 4 AM the next morning and chest x-ray showed dislodgment of LV and probably RV lead.  Telemetry showed complete heart block with ventricular escape rhythm.  Device was reprogrammed with high output to the LV lead which resulted in capture and patient was set up for lead revision.  Lead revision occurred 7/10/2024 by Dr. Perez without complication.  Device check next morning not available to me but sent to Dr. Perez was reportedly normal.  The patient is ambulating the hallways without issue or symptoms.  His blood pressure has been uncontrolled throughout admission as the only medicine has been on his Flomax for the last 8 months or so.  On the day of discharge, bisoprolol 5 was added losartan was increased to 100 mg daily.  More antihypertensives would have been added except the patient has a noncompliant history so medications were added in an attempt to give him a regimen that he would actually stay on.  He will follow-up with Dr. Anderson who can further address his cardiac medications intended for heart failure, CAD and cardiac risk factor management.  His oxygen saturation was 95% on room air.  I would recommend sleep evaluation in the future.    Discharge Exam:   Vitals:    07/11/24 1100   BP: (!) 180/118   Pulse: 71   Resp: 18   Temp: 98.2 °F (36.8 °C)   SpO2: 95%      General-Well Nourished, Well developed  Eyes - PERRLA  Neck- supple, No mass  CV- regular rate and rhythm, no MRG, No edema  Lung- clear  bilaterally  Musc/skel - Norm strength and range of motion  Skin- warm and dry  Neuro - Alert & Oriented x 3, appropriate mood.    Disposition: Patient will be discharged to: home   Condition: Stable  Follow Up:   Wound check 7-10 days  Dr. Anderson in 2 months for reestablishment of care for CAD, HFrEF, pulmonary hypertension and cardiac risk factor management  Dr. Rdz's office in 3 months with device check    Patient discharge medications:      Your medication list        START taking these medications        Instructions Last Dose Given Next Dose Due   losartan 100 MG tablet  Commonly known as: COZAAR  Start taking on: July 12, 2024      Take 1 tablet by mouth Daily.              CHANGE how you take these medications        Instructions Last Dose Given Next Dose Due   rosuvastatin 20 MG tablet  Commonly known as: CRESTOR  What changed: when to take this      Take 1 tablet by mouth Every Night.              CONTINUE taking these medications        Instructions Last Dose Given Next Dose Due   aspirin 81 MG EC tablet      Take 1 tablet by mouth Daily.       bisoprolol 5 MG tablet  Commonly known as: ZEBeta      Take 1 tablet by mouth Daily.       TAMSULOSIN HCL PO      Take 2 capsules by mouth Daily.                 Where to Get Your Medications        These medications were sent to [facility] [id number] - [place], [place] - [address] [phone]  - [phone]   [address] [address]      Phone: [phone]   bisoprolol 5 MG tablet  losartan 100 MG tablet  rosuvastatin 20 MG tablet         Referenced discharge instructions provided by nursing for diet and activity.        Signed:  Otoniel Palacios PA-C  7/11/2024  13:49 EDT

## 2024-07-11 ENCOUNTER — APPOINTMENT (OUTPATIENT)
Dept: GENERAL RADIOLOGY | Facility: HOSPITAL | Age: 84
End: 2024-07-11
Payer: MEDICARE

## 2024-07-11 VITALS
TEMPERATURE: 98.2 F | OXYGEN SATURATION: 91 % | RESPIRATION RATE: 18 BRPM | SYSTOLIC BLOOD PRESSURE: 180 MMHG | HEIGHT: 67 IN | HEART RATE: 71 BPM | BODY MASS INDEX: 25.88 KG/M2 | DIASTOLIC BLOOD PRESSURE: 118 MMHG | WEIGHT: 164.9 LBS

## 2024-07-11 LAB
ANION GAP SERPL CALCULATED.3IONS-SCNC: 15 MMOL/L (ref 5–15)
BUN SERPL-MCNC: 19 MG/DL (ref 8–23)
BUN/CREAT SERPL: 19.6 (ref 7–25)
CALCIUM SPEC-SCNC: 8.9 MG/DL (ref 8.6–10.5)
CHLORIDE SERPL-SCNC: 106 MMOL/L (ref 98–107)
CHOLEST SERPL-MCNC: 145 MG/DL (ref 0–200)
CO2 SERPL-SCNC: 17 MMOL/L (ref 22–29)
CREAT SERPL-MCNC: 0.97 MG/DL (ref 0.76–1.27)
DEPRECATED RDW RBC AUTO: 49.6 FL (ref 37–54)
EGFRCR SERPLBLD CKD-EPI 2021: 77.5 ML/MIN/1.73
ERYTHROCYTE [DISTWIDTH] IN BLOOD BY AUTOMATED COUNT: 14.3 % (ref 12.3–15.4)
GLUCOSE SERPL-MCNC: 80 MG/DL (ref 65–99)
HCT VFR BLD AUTO: 44.8 % (ref 37.5–51)
HDLC SERPL-MCNC: 49 MG/DL (ref 40–60)
HGB BLD-MCNC: 15 G/DL (ref 13–17.7)
LDLC SERPL CALC-MCNC: 81 MG/DL (ref 0–100)
LDLC/HDLC SERPL: 1.64 {RATIO}
MCH RBC QN AUTO: 31.5 PG (ref 26.6–33)
MCHC RBC AUTO-ENTMCNC: 33.5 G/DL (ref 31.5–35.7)
MCV RBC AUTO: 94.1 FL (ref 79–97)
PLATELET # BLD AUTO: 204 10*3/MM3 (ref 140–450)
PMV BLD AUTO: 10.6 FL (ref 6–12)
POTASSIUM SERPL-SCNC: 4.6 MMOL/L (ref 3.5–5.2)
RBC # BLD AUTO: 4.76 10*6/MM3 (ref 4.14–5.8)
SODIUM SERPL-SCNC: 138 MMOL/L (ref 136–145)
TRIGL SERPL-MCNC: 78 MG/DL (ref 0–150)
VLDLC SERPL-MCNC: 15 MG/DL (ref 5–40)
WBC NRBC COR # BLD AUTO: 9.25 10*3/MM3 (ref 3.4–10.8)

## 2024-07-11 PROCEDURE — 99238 HOSP IP/OBS DSCHRG MGMT 30/<: CPT

## 2024-07-11 PROCEDURE — 71046 X-RAY EXAM CHEST 2 VIEWS: CPT

## 2024-07-11 PROCEDURE — 80061 LIPID PANEL: CPT

## 2024-07-11 PROCEDURE — 93010 ELECTROCARDIOGRAM REPORT: CPT | Performed by: STUDENT IN AN ORGANIZED HEALTH CARE EDUCATION/TRAINING PROGRAM

## 2024-07-11 PROCEDURE — 93005 ELECTROCARDIOGRAM TRACING: CPT

## 2024-07-11 PROCEDURE — 85027 COMPLETE CBC AUTOMATED: CPT

## 2024-07-11 PROCEDURE — 80048 BASIC METABOLIC PNL TOTAL CA: CPT

## 2024-07-11 PROCEDURE — 25010000002 FUROSEMIDE PER 20 MG

## 2024-07-11 RX ORDER — LOSARTAN POTASSIUM 50 MG/1
100 TABLET ORAL
Status: DISCONTINUED | OUTPATIENT
Start: 2024-07-12 | End: 2024-07-11 | Stop reason: HOSPADM

## 2024-07-11 RX ORDER — ROSUVASTATIN CALCIUM 20 MG/1
20 TABLET, COATED ORAL NIGHTLY
Qty: 90 TABLET | Refills: 3 | Status: SHIPPED | OUTPATIENT
Start: 2024-07-11

## 2024-07-11 RX ORDER — BISOPROLOL FUMARATE 5 MG/1
5 TABLET, FILM COATED ORAL
Status: DISCONTINUED | OUTPATIENT
Start: 2024-07-11 | End: 2024-07-11 | Stop reason: HOSPADM

## 2024-07-11 RX ORDER — FUROSEMIDE 10 MG/ML
40 INJECTION INTRAMUSCULAR; INTRAVENOUS ONCE
Status: COMPLETED | OUTPATIENT
Start: 2024-07-11 | End: 2024-07-11

## 2024-07-11 RX ORDER — BISOPROLOL FUMARATE 5 MG/1
5 TABLET, FILM COATED ORAL DAILY
Qty: 90 TABLET | Refills: 3 | Status: SHIPPED | OUTPATIENT
Start: 2024-07-11

## 2024-07-11 RX ORDER — LOSARTAN POTASSIUM 50 MG/1
50 TABLET ORAL ONCE
Status: DISCONTINUED | OUTPATIENT
Start: 2024-07-11 | End: 2024-07-11 | Stop reason: HOSPADM

## 2024-07-11 RX ORDER — LOSARTAN POTASSIUM 100 MG/1
100 TABLET ORAL
Qty: 90 TABLET | Refills: 3 | Status: SHIPPED | OUTPATIENT
Start: 2024-07-12

## 2024-07-11 RX ADMIN — FUROSEMIDE 40 MG: 10 INJECTION, SOLUTION INTRAMUSCULAR; INTRAVENOUS at 11:33

## 2024-07-11 RX ADMIN — Medication 10 ML: at 08:36

## 2024-07-11 RX ADMIN — ASPIRIN 81 MG: 81 TABLET, COATED ORAL at 08:35

## 2024-07-11 RX ADMIN — BISOPROLOL FUMARATE 5 MG: 5 TABLET ORAL at 11:34

## 2024-07-11 RX ADMIN — LOSARTAN POTASSIUM 50 MG: 50 TABLET, FILM COATED ORAL at 08:35

## 2024-07-11 NOTE — CASE MANAGEMENT/SOCIAL WORK
Continued Stay Note  [facility]     Patient Name: Harpreet Ceballos  MRN: [id number]  Today's Date: 7/11/2024    Admit Date: 7/8/2024    Plan: home   Discharge Plan       Row Name 07/11/24 1255       Plan    Plan home    Patient/Family in Agreement with Plan yes    Plan Comments I spoke with this patient's daughter regarding his discharge home today. They are in agreement with this plan and his daughter will transport. They deny having any further discharge plannin needs at this time.    Final Discharge Disposition Code 01 - home or self-care                   Discharge Codes    No documentation.                 Expected Discharge Date and Time       Expected Discharge Date Expected Discharge Time    Jul 11, 2024               Sonya Whatley RN

## 2024-07-12 ENCOUNTER — READMISSION MANAGEMENT (OUTPATIENT)
Dept: CALL CENTER | Facility: HOSPITAL | Age: 84
End: 2024-07-12
Payer: MEDICARE

## 2024-07-12 LAB
QT INTERVAL: 474 MS
QTC INTERVAL: 522 MS

## 2024-07-12 NOTE — OUTREACH NOTE
Prep Survey      Flowsheet Row Responses   [facility] facility patient discharged from? [facility]   Is LACE score < 7 ? No   Eligibility Readm Mgmt   Discharge diagnosis Complete heart block, BiV PPM placement   Does the patient have one of the following disease processes/diagnoses(primary or secondary)? General Surgery   Does the patient have Home health ordered? No   Is there a DME ordered? No   Medication alerts for this patient see avs   Prep survey completed? Yes            Sharron MEEKS - Registered Nurse

## 2024-07-16 ENCOUNTER — READMISSION MANAGEMENT (OUTPATIENT)
Dept: CALL CENTER | Facility: HOSPITAL | Age: 84
End: 2024-07-16
Payer: MEDICARE

## 2024-07-16 ENCOUNTER — OFFICE VISIT (OUTPATIENT)
Dept: CARDIOLOGY | Facility: CLINIC | Age: 84
End: 2024-07-16
Payer: MEDICARE

## 2024-07-16 DIAGNOSIS — I44.2 COMPLETE HEART BLOCK: Primary | ICD-10-CM

## 2024-07-16 NOTE — OUTREACH NOTE
General Surgery Week 1 Survey      Flowsheet Row Responses   [facility] patient discharged from? [place]   Does the patient have one of the following disease processes/diagnoses(primary or secondary)? General Surgery   Week 1 attempt successful? Yes   Call start time 0957   Call end time 1001   Discharge diagnosis Complete heart block, BiV PPM placement   Is patient permission given to speak with other caregiver? Yes   List who call center can speak with Kristina spouse and pt   Person spoke with today (if not patient) and relationship Kristina spouse and pt   Meds reviewed with patient/caregiver? Yes   Is the patient having any side effects they believe may be caused by any medication additions or changes? No   Does the patient have all medications related to this admission filled (includes all antibiotics, pain medications, etc.) Yes   Is the patient taking all medications as directed (includes completed medication regime)? Yes   Does the patient have a follow up appointment scheduled with their surgeon? Yes  [wound check cardiology]   Has the patient kept scheduled appointments due by today? Yes  [wound check cardiology]   Did the patient receive a copy of their discharge instructions? Yes   Nursing interventions Reviewed instructions with patient   What is the patient's perception of their health status since discharge? Improving   Nursing interventions Nurse provided patient education   Is the patient /caregiver able to teach back basic post-op care? Take showers only when approved by MD-sponge bathe until then, No tub bath, swimming, or hot tub until instructed by MD, Keep incision areas clean,dry and protected, Lifting as instructed by MD in discharge instructions, Drive as instructed by MD in discharge instructions, Continue use of incentive spirometry at least 1 week post discharge   Is the patient/caregiver able to teach back signs and symptoms of incisional infection? Increased redness, swelling or pain  at the incisonal site, Increased drainage or bleeding, Incisional warmth, Pus or odor from incision   Is the patient/caregiver able to teach back steps to recovery at home? Set small, achievable goals for return to baseline health, Rest and rebuild strength, gradually increase activity, Practice good oral hygiene, Make a list of questions for surgeon's appointment, Eat a well-balance diet   If the patient is a current smoker, are they able to teach back resources for cessation? Not a smoker   Is the patient/caregiver able to teach back the hierarchy of who to call/visit for symptoms/problems? PCP, Specialist, Home health nurse, Urgent Care, ED, 911 Yes   Week 1 call completed? Yes   Graduated Yes   Graduated/Revoked comments pt did not have any complaints during the call-pt has a wound care check with cards today (7/16/24)   Call end time 1001            Terri MEEKS - Registered Nurse

## 2024-07-16 NOTE — PROGRESS NOTES
2024    Harpreet Ceballos, : 1940      Fever: No    Temperature if indicated: 98.6    Wound Location: Left Infraclavicular    Dressing Removed: Removed by MA/RN      Old Dressing Appearance:  Clean, dry    Wound Appearance: Redness []                  Drainage []                  Culture obtained []        Color: Clear     Consistency:  na     Amount: na         Gloves used, wound cleansed with sterile 4x4 and peroxide [x]       MD notified []     MD orders:     Antibiotic started []      If checked, type     Other:       Appointment for follow-up scheduled for 3 months post procedure [x]    Future Appointments   Date Time Provider Department Center   2024  1:30 PM Oscar Anderson III, MD [facility]   10/22/2024  2:30 PM Brandon Rdz MD [facility]           April REBECCA Whatley, 24      MD Signature:______________________________ Completed By/Date:

## 2024-09-11 ENCOUNTER — OFFICE VISIT (OUTPATIENT)
Dept: CARDIOLOGY | Facility: CLINIC | Age: 84
End: 2024-09-11
Payer: MEDICARE

## 2024-09-11 VITALS
HEIGHT: 67 IN | DIASTOLIC BLOOD PRESSURE: 60 MMHG | OXYGEN SATURATION: 95 % | SYSTOLIC BLOOD PRESSURE: 118 MMHG | WEIGHT: 160 LBS | HEART RATE: 81 BPM | BODY MASS INDEX: 25.11 KG/M2

## 2024-09-11 DIAGNOSIS — E78.2 MIXED HYPERLIPIDEMIA: ICD-10-CM

## 2024-09-11 DIAGNOSIS — I10 ESSENTIAL HYPERTENSION: ICD-10-CM

## 2024-09-11 DIAGNOSIS — I25.10 CORONARY ARTERY DISEASE INVOLVING NATIVE CORONARY ARTERY OF NATIVE HEART WITHOUT ANGINA PECTORIS: Primary | ICD-10-CM

## 2024-09-11 NOTE — PROGRESS NOTES
[facility]  Office visit  Harpreet Ceballos  1940    There is no work phone number on file.    VISIT DATE:  9/11/2024      PCP: Provider, No Known  [facility] [id number]    CC:  Chief Complaint   Patient presents with    Coronary artery disease involving native coronary artery of       Previous cardiac studies and procedures:  August 2018  Echo  Calculated EF = 50%. Estimated EF appears to be in the range of 51 - 55%.  Left ventricular wall thickness is consistent with mild concentric hypertrophy.  Mild to moderate aortic valve regurgitation is present.  Mild dilation of the ascending aorta is present. 4 cm.  Myocardial perfusion imaging  REST EF = 38% STRESS EF = 34%.  Left ventricular ejection fraction is moderately reduced and severely dilated.  Moderate size region of moderate ischemia involving the anterior wall and apex.  Large region of ischemia involving the entire lateral wall, significantly decreased at rest well potentially consistent for underlying infarction versus hibernating myocardium  Impressions are consistent with a high risk study. Concerning for multivessel disease.  Cardiac catheterization  LM: Dilated as a continuation of dilated sinuses of Valsalva.    LAD: 70% ostial stenosis, 60% proximal stenosis and a long 80% midsegment stenosis.  The distal vessel has diffuse plaque.  A medium size first diagonal has 90% stenosis.  LCX: Nondominant vessel with 70% proximal stenosis and total mid segment occlusion.  A major obtuse marginal branch is also totally occluded in its midsegment.  The lateral filling of the obtuse marginal and the distal circumflex is seen via left-sided collaterals.  RCA: Dominant vessel with a 90% distal stenosis.  Three-vessel obstructive coronary disease.  CT angio chest  dilatation of the aortic root measuring up to 4.3 cm   CTA neck  Atherosclerotic involvement of the bilateral carotid bulbs far greater  on the right  with approximately 80% luminal narrowing by NASCET  Criteria.    September 2018   -coronary bypass grafting: LIMA to first diagonal and left anterior descending artery, vein graft to obtuse marginal one and obtuse marginal 2, vein graft to right posterior descending artery.    March 2019 echo  Estimated EF appears to be in the range of 56 - 60%.  Mild dilation of the sinuses of Valsalva, 4cm. Mild dilation of the ascending aorta, 4.3cm.  Mild aortic valve regurgitation is present.  Elevated left atrial pressure.    December 2019 carotid duplex  Right internal carotid artery stenosis of 50-69%.  Proximal left internal carotid artery plaque without significant stenosis.  Left internal carotid artery stenosis of 0-49%.    April 2021  Carotid duplex imaging  Proximal right internal carotid artery plaque without significant stenosis.  Right internal carotid artery stenosis of 0-49%.  Proximal left internal carotid artery plaque without significant stenosis.  Left internal carotid artery stenosis of 0-49%.  Transthoracic echocardiogram  Calculated left ventricular EF = 33% Estimated left ventricular EF was in agreement with the calculated left ventricular EF. Left ventricular systolic function is moderately decreased.  Left ventricular diastolic function is consistent with (grade I) impaired relaxation.  Left ventricular wall thickness is consistent with moderate concentric hypertrophy.  Left atrial volume is mildly increased.  Moderate dilation of the sinuses of Valsalva is present. Mild dilation of the ascending aorta is present.  Moderate dilation of the sinuses of Valsalva is present. Sinus of Valsalva = 4.9 cm  Mild dilation of the ascending aorta is present. Ascending aorta = 4.0 cm  Mild aortic valve regurgitation    May 2021 Liz scan myocardial perfusion imaging  Attenuation corrected images show mild-moderate fixed defects in the mid-apical anteroseptal wall, mid-apical lateral wall, and apex. No reversible  ischemic defects (SDS = 0)  The LV appears dilated at both rest and stress. Normal TID ratio 1.02  Left ventricular ejection fraction is moderately reduced. (Calculated EF = 36%).  Impression: Intermediate risk study secondary to reduced LV function EF 36%. Fixed anteroseptal, lateral, apical defects. No significant ischemia noted.    July 2024 presented with complete heart block  TTE    Left ventricular systolic function is moderately decreased. Left ventricular ejection fraction appears to be 36 - 40%.    Left ventricular wall thickness is consistent with mild concentric hypertrophy.    The following left ventricular wall segments are hypokinetic: basal anterolateral, mid anterolateral, apical lateral, basal inferolateral, mid inferolateral and mid inferior. The following left ventricular wall segments are akinetic: basal inferior.    Mildly reduced right ventricular systolic function noted.    The right ventricular cavity is mildly dilated.    The left atrial cavity is moderately dilated.    The right atrial cavity is dilated.    Moderate aortic valve regurgitation is present.    There is mild, posterior mitral leaflet thickening present.    Estimated right ventricular systolic pressure from tricuspid regurgitation is markedly elevated (>55 mmHg). Calculated right ventricular systolic pressure from tricuspid regurgitation is 69 mmHg.  [employer] biventricular pacemaker    ASSESSMENT:   Diagnosis Plan   1. Coronary artery disease involving native coronary artery of native heart without angina pectoris        2. Essential hypertension        3. Mixed hyperlipidemia              PLAN:  Coronary artery disease: Stable and asymptomatic.  Continue aspirin, statin and afterload reduction.    Hyperlipidemia: Currently with excellent control.  Goal LDL less than 70.  Patient was instructed to take rosuvastatin 10 mg p.o. daily.  Previously sensitive to changes in his medical therapy.    Carotid stenosis, bilateral:  "Right-sided TIA in December.   Stable on carotid duplex.  Continue aspirin.    Ascending aortic aneurysm: Goal blood pressure less than 130/80 mmHg.  Continue current medical therapy.  Restarting beta-blockade.  Continue imaging surveillance on an annual basis.    Cardiomyopathy, ischemic: Moderately decreased EF.  Continue bisoprolol 5 mg p.o. daily.  Patient was instructed to take losartan 100 mg p.o. daily.  Currently euvolemic and compensated.  Will titrate GDMT as patient tolerates.    Subjective  Presented with left hand weakness to [facility] in December 2019.  CTA revealed a 50% right internal carotid artery stenosis, occluded right vertebral artery, and mild to moderate iCAD.   Has been taking an aspirin a day.  Recently presented with symptomatic complete heart block.  Status post pacemaker implantation.  Is done well since that time.  Appears to be taking his bisoprolol in daily basis.  Currently he is decreased his losartan to every other day dosing.  Stopped taking his rosuvastatin because he felt that his cholesterol was fairly well-controlled.    PHYSICAL EXAMINATION:  Vitals:    09/11/24 1253   BP: 118/60   BP Location: Right arm   Patient Position: Sitting   Cuff Size: Adult   Pulse: 81   SpO2: 95%   Weight: 72.6 kg (160 lb)   Height: 170.2 cm (67.01\")     General Appearance:    Alert, cooperative, no distress, appears stated age   Head:    Normocephalic, without obvious abnormality, atraumatic   Eyes:    conjunctiva/corneas clear   Nose:   Nares normal, septum midline, mucosa normal, no drainage   Throat:   Lips, teeth and gums normal   Neck:   Supple, symmetrical, trachea midline, no carotid    bruit or JVD   Lungs:     Clear to auscultation bilaterally, respirations unlabored   Chest Wall:    No tenderness or deformity    Heart:   Regular rate and rhythm, no murmurs rubs gallops, normal S1-S2.  Bilateral carotid bruits.   Abdomen:     Soft, non-tender   Extremities:   Extremities " "normal, atraumatic, no cyanosis or edema   Pulses:   2+ and symmetric all extremities   Skin:   Skin color, texture, turgor normal, no rashes or lesions       Diagnostic Data:  Procedures  Lab Results   Component Value Date    TRIG 78 07/11/2024    HDL 49 07/11/2024     Lab Results   Component Value Date    GLUCOSE 80 07/11/2024    BUN 19 07/11/2024    CREATININE 0.97 07/11/2024     07/11/2024    K 4.6 07/11/2024     07/11/2024    CO2 17.0 (L) 07/11/2024     No results found for: \"HGBA1C\"  Lab Results   Component Value Date    WBC 9.25 07/11/2024    HGB 15.0 07/11/2024    HCT 44.8 07/11/2024     07/11/2024       Allergies  No Known Allergies    Current Medications    Current Outpatient Medications:     aspirin 81 MG EC tablet, Take 1 tablet by mouth Daily., Disp: , Rfl:     bisoprolol (ZEBeta) 5 MG tablet, Take 1 tablet by mouth Daily., Disp: 90 tablet, Rfl: 3    losartan (COZAAR) 100 MG tablet, Take 1 tablet by mouth Daily., Disp: 90 tablet, Rfl: 3    rosuvastatin (CRESTOR) 20 MG tablet, Take 1 tablet by mouth Every Night., Disp: 90 tablet, Rfl: 3    TAMSULOSIN HCL PO, Take 2 capsules by mouth Daily., Disp: , Rfl:           ROS  Review of Systems   Cardiovascular:  Positive for irregular heartbeat. Negative for chest pain, dyspnea on exertion, leg swelling, near-syncope and palpitations.   Respiratory:  Negative for cough, shortness of breath and sputum production.        SOCIAL HX  Social History     Socioeconomic History    Marital status:    Tobacco Use    Smoking status: Never    Smokeless tobacco: Never   Vaping Use    Vaping status: Never Used   Substance and Sexual Activity    Alcohol use: Yes     Alcohol/week: 1.0 standard drink of alcohol     Types: 1 Cans of beer per week     Comment: A BEER A DAY    Drug use: No    Sexual activity: Yes     Partners: Female     Birth control/protection: None     Comment: Age       FAMILY HX  Family History   Problem Relation Age of Onset    No " Known Problems Mother     No Known Problems Father              Oscar Andersno III, MD, FACC

## 2024-09-23 ENCOUNTER — TELEPHONE (OUTPATIENT)
Dept: CARDIOLOGY | Facility: CLINIC | Age: 84
End: 2024-09-23
Payer: MEDICARE

## 2024-10-22 ENCOUNTER — OFFICE VISIT (OUTPATIENT)
Dept: CARDIOLOGY | Facility: CLINIC | Age: 84
End: 2024-10-22
Payer: MEDICARE

## 2024-10-22 ENCOUNTER — PATIENT ROUNDING (BHMG ONLY) (OUTPATIENT)
Dept: CARDIOLOGY | Facility: CLINIC | Age: 84
End: 2024-10-22
Payer: MEDICARE

## 2024-10-22 VITALS
HEART RATE: 84 BPM | HEIGHT: 67 IN | SYSTOLIC BLOOD PRESSURE: 124 MMHG | DIASTOLIC BLOOD PRESSURE: 88 MMHG | OXYGEN SATURATION: 97 % | BODY MASS INDEX: 25.43 KG/M2 | WEIGHT: 162 LBS

## 2024-10-22 DIAGNOSIS — I44.2 COMPLETE HEART BLOCK: Primary | ICD-10-CM

## 2024-10-22 DIAGNOSIS — I10 ESSENTIAL HYPERTENSION: ICD-10-CM

## 2024-10-22 DIAGNOSIS — I25.10 CORONARY ARTERY DISEASE INVOLVING NATIVE CORONARY ARTERY OF NATIVE HEART WITHOUT ANGINA PECTORIS: ICD-10-CM

## 2024-10-22 PROCEDURE — 3074F SYST BP LT 130 MM HG: CPT | Performed by: PHYSICIAN ASSISTANT

## 2024-10-22 PROCEDURE — 99214 OFFICE O/P EST MOD 30 MIN: CPT | Performed by: PHYSICIAN ASSISTANT

## 2024-10-22 PROCEDURE — 93000 ELECTROCARDIOGRAM COMPLETE: CPT | Performed by: PHYSICIAN ASSISTANT

## 2024-10-22 PROCEDURE — 3079F DIAST BP 80-89 MM HG: CPT | Performed by: PHYSICIAN ASSISTANT

## 2024-10-22 NOTE — PROGRESS NOTES
October 22, 2024    Hello, may I speak with Harpreet Ceballos?    My name is Bernarda Hamilton      I am  with [facility]  [address]  [address]  [place] [address] [phone].    Before we get started may I verify your date of birth? 1940    Tell me about your visit with us. What things went well?  Everyone was professional and knowledgeable. I got all my questions answered. Bernarda at check in is always so nice.       We're always looking for ways to make our patients' experiences even better. Do you have recommendations on ways we may improve?  no    Overall were you satisfied with your visit to our practice? yes       I appreciate you taking the time to speak with me today. Is there anything else I can do for you? no      Thank you, and have a great day.

## 2024-10-22 NOTE — PROGRESS NOTES
[facility] at [facility]   OFFICE NOTE      Harpreet Ceballos  1940  PCP: Provider, No Known    SUBJECTIVE:   Harpreet Ceballos is a 84 y.o. male seen for a follow up visit regarding the following:     CC:ICM    HPI:   This is a pleasant 84-year-old gent returns today for follow-up regarding ischemic cardiomyopathy, congestive heart failure, coronary disease and [employer] pacemaker placement on July 9.  This device required a lead revision.  He presented to [facility] with complete heart block and known history of cardiomyopathy required a BiV pacemaker.  He had a lead revision a couple days later.  Since has been doing quite well.  Heart failure symptoms stable.  He has no angina symptoms no dizziness near syncope syncope.  Tolerating medications well.    Cardiac PMH: (Old records have been reviewed and summarized below)  Complete AV Block  S/p BSC BiV PM by Dr. Oscar Perez 7/9/24 and lead revision July 11 2024.  CAD s/p CABG x5 9/2018: LIMA to D1 and LAD, SVG to OM1 and OM 2, SVG to RPDA  Ischemic Cardiomyopathy  TTE 3/2019: LVEF 56-60%, mild AI  TTE 4/2021: LVEF 33%  TTE 7/2024: LVEF 36-40%, mild concentric LVH, basal inferior akinesis, various hypokinetic walls, moderate aortic insufficiency, RVSP >55  Carotid Stenosis  12/2019 CTA 50% R ICA stenosis, occluded right vertebral artery, mild to moderate CAD  TIA 12/2020 ?  Hypertension  Medical noncompliance    Past Medical History, Past Surgical History, Family history, Social History, and Medications were all reviewed with the patient today and updated as necessary.       Current Outpatient Medications:     aspirin 81 MG EC tablet, Take 1 tablet by mouth Daily., Disp: , Rfl:     bisoprolol (ZEBeta) 5 MG tablet, Take 1 tablet by mouth Daily., Disp: 90 tablet, Rfl: 3    losartan (COZAAR) 100 MG tablet, Take 1 tablet by mouth Daily., Disp: 90 tablet, Rfl: 3    rosuvastatin (CRESTOR) 20 MG tablet, Take 1 tablet by mouth Every  "Night., Disp: 90 tablet, Rfl: 3    TAMSULOSIN HCL PO, Take 2 capsules by mouth Daily., Disp: , Rfl:       No Known Allergies      PHYSICAL EXAM:    /88 (BP Location: Right arm, Patient Position: Sitting)   Pulse 84   Ht 170.2 cm (67\")   Wt 73.5 kg (162 lb)   SpO2 97%   BMI 25.37 kg/m²        Wt Readings from Last 5 Encounters:   10/22/24 73.5 kg (162 lb)   09/11/24 72.6 kg (160 lb)   07/08/24 74.8 kg (164 lb 14.5 oz)   09/27/21 87.7 kg (193 lb 6.4 oz)   06/09/21 85.7 kg (189 lb)       BP Readings from Last 5 Encounters:   10/22/24 124/88   09/11/24 118/60   07/11/24 (!) 180/118   09/27/21 124/76   06/09/21 160/78       General appearance - Alert, well appearing, and in no distress   Mental status - Affect appropriate to mood.  Eyes - Sclerae anicteric,  ENMT - Hearing grossly normal bilaterally, Dental hygiene good.  Neck - Carotids upstroke normal bilaterally, no bruits, no JVD.  Resp - Clear to auscultation, no wheezes, rales or rhonchi, symmetric air entry.  Heart - Normal rate, regular rhythm, normal S1, S2, no murmurs, rubs, clicks or gallops.  GI - Soft, nontender, nondistended, no masses or organomegaly.  Neurological - Grossly intact - normal speech, no focal findings  Musculoskeletal - No joint tenderness, deformity or swelling, no muscular tenderness noted.  Extremities - Peripheral pulses normal, no pedal edema, no clubbing or cyanosis.  Skin - Normal coloration and turgor.  Psych -  oriented to person, place, and time.    Medical problems and test results were reviewed with the patient today.     No results found for this or any previous visit (from the past 4 weeks).      EKG: (EKG has been independently visualized by me and summarized below)    ECG 12 Lead    Date/Time: 10/22/2024 2:02 PM  Performed by: Fabricio Longoria PA    Authorized by: Fabricio Longoria PA  Comparison: compared with previous ECG from 7/12/2024  Rhythm: sinus rhythm and paced  Rate: normal    Clinical impression: " non-specific ECG           Device Interrogation:  Please see device interrogation form which has been signed and updated for full details.[facility] BIVPPM . A paced 30%, CRT 98%. . Normal thresholds  and impedinces. Battery voltage 11 years. Less than one percent AT, AFib.     ASSESSMENT   1. CHB: [facility] BIVPPM 7/9/2024 with RV lead and LV lead revision July 11, 2024..  Interrogation today stable.    2. CAD: S/p CABG x 5 2018, Asa and statin     3. CM: IMELDA 7/2024 EF 40%.     4. HTN: Controlled-per patient     6. Medical Noncompliance,patient not taking medications.     PLAN  Discussed need to take medications-including statin, asa, Losartan, Bystolic for CM, HTN and CHF. He declines. He is not medically noncompliant.   Follow up 6 months    Electronically signed by EDA Ruiz, 10/22/24, 2:04 PM EDT.             10/22/2024  13:49 EDT  Electronically signed by EDA Ruiz, 10/22/24, 1:50 PM EDT.

## 2024-11-13 PROCEDURE — 93294 REM INTERROG EVL PM/LDLS PM: CPT | Performed by: STUDENT IN AN ORGANIZED HEALTH CARE EDUCATION/TRAINING PROGRAM

## 2024-11-13 PROCEDURE — 93296 REM INTERROG EVL PM/IDS: CPT | Performed by: STUDENT IN AN ORGANIZED HEALTH CARE EDUCATION/TRAINING PROGRAM

## 2025-02-12 PROCEDURE — 93294 REM INTERROG EVL PM/LDLS PM: CPT | Performed by: STUDENT IN AN ORGANIZED HEALTH CARE EDUCATION/TRAINING PROGRAM

## 2025-02-12 PROCEDURE — 93296 REM INTERROG EVL PM/IDS: CPT | Performed by: STUDENT IN AN ORGANIZED HEALTH CARE EDUCATION/TRAINING PROGRAM

## 2025-08-14 LAB
MC_CV_MDC_IDC_RATE_1: 160
MC_CV_MDC_IDC_ZONE_ID: 1
MDC_IDC_MSMT_BATTERY_REMAINING_LONGEVITY: 120 MO
MDC_IDC_MSMT_BATTERY_REMAINING_PERCENTAGE: 100 %
MDC_IDC_MSMT_BATTERY_STATUS: NORMAL
MDC_IDC_MSMT_LEADCHNL_LV_IMPEDANCE_VALUE: 1426
MDC_IDC_MSMT_LEADCHNL_LV_PACING_THRESHOLD_POLARITY: NORMAL
MDC_IDC_MSMT_LEADCHNL_RA_DTM: NORMAL
MDC_IDC_MSMT_LEADCHNL_RA_IMPEDANCE_VALUE: 597
MDC_IDC_MSMT_LEADCHNL_RA_PACING_THRESHOLD_POLARITY: NORMAL
MDC_IDC_MSMT_LEADCHNL_RA_SENSING_INTR_AMPL: 5.5
MDC_IDC_MSMT_LEADCHNL_RV_DTM: NORMAL
MDC_IDC_MSMT_LEADCHNL_RV_IMPEDANCE_VALUE: 687
MDC_IDC_MSMT_LEADCHNL_RV_PACING_THRESHOLD_POLARITY: NORMAL
MDC_IDC_PG_IMPLANT_DTM: NORMAL
MDC_IDC_PG_MFG: NORMAL
MDC_IDC_PG_MODEL: NORMAL
MDC_IDC_PG_SERIAL: NORMAL
MDC_IDC_PG_TYPE: NORMAL
MDC_IDC_SESS_DTM: NORMAL
MDC_IDC_SESS_TYPE: NORMAL
MDC_IDC_SET_BRADY_AT_MODE_SWITCH_RATE: 170
MDC_IDC_SET_BRADY_LOWRATE: 60
MDC_IDC_SET_BRADY_MAX_SENSOR_RATE: 130
MDC_IDC_SET_BRADY_MAX_TRACKING_RATE: 120
MDC_IDC_SET_BRADY_MODE: NORMAL
MDC_IDC_SET_BRADY_PAV_DELAY: 180
MDC_IDC_SET_BRADY_SAV_DELAY: 120
MDC_IDC_SET_CRT_LVRV_DELAY: 0
MDC_IDC_SET_CRT_PACED_CHAMBERS: NORMAL
MDC_IDC_SET_LEADCHNL_LV_PACING_AMPLITUDE: 1.8
MDC_IDC_SET_LEADCHNL_LV_PACING_PULSEWIDTH: 1
MDC_IDC_SET_LEADCHNL_RA_PACING_AMPLITUDE: 2
MDC_IDC_SET_LEADCHNL_RA_PACING_POLARITY: NORMAL
MDC_IDC_SET_LEADCHNL_RA_PACING_PULSEWIDTH: 0.5
MDC_IDC_SET_LEADCHNL_RA_SENSING_POLARITY: NORMAL
MDC_IDC_SET_LEADCHNL_RA_SENSING_SENSITIVITY: 0.25
MDC_IDC_SET_LEADCHNL_RV_PACING_AMPLITUDE: 2
MDC_IDC_SET_LEADCHNL_RV_PACING_POLARITY: NORMAL
MDC_IDC_SET_LEADCHNL_RV_PACING_PULSEWIDTH: 0.5
MDC_IDC_SET_LEADCHNL_RV_SENSING_POLARITY: NORMAL
MDC_IDC_SET_LEADCHNL_RV_SENSING_SENSITIVITY: 0.6
MDC_IDC_SET_ZONE_STATUS: NORMAL
MDC_IDC_SET_ZONE_TYPE: NORMAL
MDC_IDC_STAT_AT_BURDEN_PERCENT: 0
MDC_IDC_STAT_BRADY_RA_PERCENT_PACED: 34
MDC_IDC_STAT_BRADY_RV_PERCENT_PACED: 98
MDC_IDC_STAT_CRT_LV_PERCENT_PACED: 98

## 2025-08-26 ENCOUNTER — OFFICE VISIT (OUTPATIENT)
Dept: CARDIOLOGY | Facility: CLINIC | Age: 85
End: 2025-08-26
Payer: MEDICARE

## 2025-08-26 VITALS
SYSTOLIC BLOOD PRESSURE: 128 MMHG | WEIGHT: 170.6 LBS | DIASTOLIC BLOOD PRESSURE: 72 MMHG | OXYGEN SATURATION: 97 % | HEIGHT: 67 IN | BODY MASS INDEX: 26.78 KG/M2 | HEART RATE: 87 BPM

## 2025-08-26 DIAGNOSIS — Z95.0 PRESENCE OF BIVENTRICULAR CARDIAC PACEMAKER: ICD-10-CM

## 2025-08-26 DIAGNOSIS — I25.5 ISCHEMIC CARDIOMYOPATHY: Chronic | ICD-10-CM

## 2025-08-26 DIAGNOSIS — I44.2 COMPLETE HEART BLOCK: Primary | Chronic | ICD-10-CM

## 2025-08-26 DIAGNOSIS — I10 ESSENTIAL HYPERTENSION: Chronic | ICD-10-CM

## 2025-08-26 RX ORDER — FINASTERIDE 5 MG/1
5 TABLET, FILM COATED ORAL DAILY
COMMUNITY
Start: 2025-07-11

## (undated) DEVICE — GUIDE WIRE WITH HYDROPHILIC COATING: Brand: ACUITY WHISPER VIEW™

## (undated) DEVICE — INTRO SHEATH PRELUDE IDEAL SPRNG COIL 021 6F 23X80CM

## (undated) DEVICE — ADULT, W/LG. BACK PAD, RADIOTRANSPARENT ELEMENT AND LEAD WIRE COMPATIBLE W/: Brand: DEFIBRILLATION ELECTRODES

## (undated) DEVICE — SET PRIMARY GRVTY 10DP MALE LL 104IN

## (undated) DEVICE — GW INQWIRE FC PTFE STD J/1.5 .035 260

## (undated) DEVICE — BALN CATH PRESS WEDGE 6F 110CM

## (undated) DEVICE — INTRO TEAR AWAY/LVD W/SD PRT 9.5F 13CM

## (undated) DEVICE — TRAP FLD MINIVAC MEGADYNE 100ML

## (undated) DEVICE — DRSNG SURESITE123 4X4.8IN

## (undated) DEVICE — CAUTERY TIP POLISHER: Brand: DEVON

## (undated) DEVICE — IRRIGATOR BULB ASEPTO 60CC STRL

## (undated) DEVICE — LEX ELECTRO PHYSIOLOGY: Brand: MEDLINE INDUSTRIES, INC.

## (undated) DEVICE — CATH DIAG EXPO .045 FL3  5F 100CM

## (undated) DEVICE — CATH DIAG EXPO M/ PK 5F FL4/FR4 PIG

## (undated) DEVICE — GW PERIPH GUIDERIGHT STD/EXCHNG/J/TIP SS 0.035IN 5X260CM

## (undated) DEVICE — MEDI-VAC YANKAUER SUCTION HANDLE W/BULBOUS TIP: Brand: CARDINAL HEALTH

## (undated) DEVICE — LIMB HOLDER, WRIST/ANKLE: Brand: DEROYAL

## (undated) DEVICE — ST EXT IV SMRTSTE 2VLV FIX M LL 6ML 41

## (undated) DEVICE — CATH DIAG EXPO .056 AL2 6F 100CM

## (undated) DEVICE — CANN NASL CO2 DIVIDED A/

## (undated) DEVICE — ELECTRD RETRN/GRND MEGADYNE SGL/PLT W/CORD 9FT DISP

## (undated) DEVICE — TUBING, SUCTION, 1/4" X 10', STRAIGHT: Brand: MEDLINE

## (undated) DEVICE — PLASMABLADE PS210-030S 3.0S LOCK: Brand: PLASMABLADE™

## (undated) DEVICE — DRSNG SURG AQUACEL AG/ADVNTGE 9X15CM 3.5X6IN

## (undated) DEVICE — INTRO TEAR AWAY/LVD W/SD PRT 6F 13CM

## (undated) DEVICE — SLITTER CATH GUIDE ATTAIN ADJ

## (undated) DEVICE — MODEL AT P65, P/N 701554-001KIT CONTENTS: HAND CONTROLLER, 3-WAY HIGH-PRESSURE STOPCOCK WITH ROTATING END AND PREMIUM HIGH-PRESSURE TUBING: Brand: ANGIOTOUCH® KIT

## (undated) DEVICE — CATH DS ATTAIN SELECT2 TP/SHT CRV 90D 7F 65CM

## (undated) DEVICE — SOL NACL 0.9PCT 1000ML

## (undated) DEVICE — CATH DIAG EXPO .045 FL5 5F 100CM

## (undated) DEVICE — DEV COMP RAD PRELUDESYNC 24CM

## (undated) DEVICE — PK CATH CARD 10

## (undated) DEVICE — ST INF PRI SMRTSTE 20DRP 2VLV 24ML 117

## (undated) DEVICE — RADIFOCUS GLIDEWIRE: Brand: GLIDEWIRE

## (undated) DEVICE — PENCL SMOKE/EVAC MEGADYNE TELESCP 10FT

## (undated) DEVICE — DECANT BG O JET

## (undated) DEVICE — CATH GUIDE ATTAIN COMMND SURVLV EH9F50CM